# Patient Record
Sex: MALE | Race: WHITE
[De-identification: names, ages, dates, MRNs, and addresses within clinical notes are randomized per-mention and may not be internally consistent; named-entity substitution may affect disease eponyms.]

---

## 2022-09-16 ENCOUNTER — HOSPITAL ENCOUNTER (EMERGENCY)
Dept: HOSPITAL 97 - ER | Age: 77
Discharge: HOME | End: 2022-09-16
Payer: COMMERCIAL

## 2022-09-16 DIAGNOSIS — N39.0: Primary | ICD-10-CM

## 2022-09-16 DIAGNOSIS — F17.220: ICD-10-CM

## 2022-09-16 DIAGNOSIS — E11.9: ICD-10-CM

## 2022-09-16 LAB
ALBUMIN SERPL BCP-MCNC: 3.8 G/DL (ref 3.4–5)
ALP SERPL-CCNC: 95 U/L (ref 45–117)
ALT SERPL W P-5'-P-CCNC: 25 U/L (ref 12–78)
AST SERPL W P-5'-P-CCNC: 20 U/L (ref 15–37)
BUN BLD-MCNC: 12 MG/DL (ref 7–18)
GLUCOSE SERPLBLD-MCNC: 129 MG/DL (ref 74–106)
HCT VFR BLD CALC: 44.4 % (ref 39.6–49)
INR BLD: 1.04
LIPASE SERPL-CCNC: 137 U/L (ref 73–393)
LYMPHOCYTES # SPEC AUTO: 0.7 K/UL (ref 0.7–4.9)
MCV RBC: 82.8 FL (ref 80–100)
PMV BLD: 11.6 FL (ref 7.6–11.3)
POTASSIUM SERPL-SCNC: 3.9 MMOL/L (ref 3.5–5.1)
RBC # BLD: 5.37 M/UL (ref 4.33–5.43)

## 2022-09-16 PROCEDURE — 87088 URINE BACTERIA CULTURE: CPT

## 2022-09-16 PROCEDURE — 85610 PROTHROMBIN TIME: CPT

## 2022-09-16 PROCEDURE — 81015 MICROSCOPIC EXAM OF URINE: CPT

## 2022-09-16 PROCEDURE — 83690 ASSAY OF LIPASE: CPT

## 2022-09-16 PROCEDURE — 85025 COMPLETE CBC W/AUTO DIFF WBC: CPT

## 2022-09-16 PROCEDURE — 96365 THER/PROPH/DIAG IV INF INIT: CPT

## 2022-09-16 PROCEDURE — 99284 EMERGENCY DEPT VISIT MOD MDM: CPT

## 2022-09-16 PROCEDURE — 80053 COMPREHEN METABOLIC PANEL: CPT

## 2022-09-16 PROCEDURE — 36415 COLL VENOUS BLD VENIPUNCTURE: CPT

## 2022-09-16 PROCEDURE — 85730 THROMBOPLASTIN TIME PARTIAL: CPT

## 2022-09-16 PROCEDURE — 87086 URINE CULTURE/COLONY COUNT: CPT

## 2022-09-16 PROCEDURE — 81003 URINALYSIS AUTO W/O SCOPE: CPT

## 2022-09-16 PROCEDURE — 76377 3D RENDER W/INTRP POSTPROCES: CPT

## 2022-09-16 PROCEDURE — 74176 CT ABD & PELVIS W/O CONTRAST: CPT

## 2022-09-16 NOTE — EDPHYS
Physician Documentation                                                                           

 [facility]                                                                 

Name: Dennis Ramsay                                                                            

Age: 77 yrs                                                                                       

Sex: Male                                                                                         

: 1945                                                                                   

MRN: [id number]                                                                                   

Arrival Date: 2022                                                                          

Time: 13:09                                                                                       

Account#: [id number]                                                                            

Bed 13                                                                                            

Private MD: William Shoemaker                                                                       

ED Physician Joshua Robertson                                                                       

HPI:                                                                                              

                                                                                             

13:50 This 77 yrs old Male presents to ER via Ambulatory with complaints of Urinary Retention.cp  

13:50 The patient presents with urinary symptoms, unable to void, hematuria.                  cp  

13:50 Onset: The symptoms/episode began/occurred last night.                                  cp  

13:50 Associated signs and symptoms: Pertinent positives: pelvic pain, Pertinent negatives:   cp  

      constipation, diarrhea, fever, vomiting. Severity of symptoms: in the emergency             

      department the symptoms are unchanged, despite home interventions.                          

                                                                                                  

Historical:                                                                                       

- Allergies:                                                                                      

13:29 Latex, Natural Rubber;                                                                  kr3 

- Home Meds:                                                                                      

16:21 glipizide 10 mg Oral tab 2 times per day [Active]; Lyrica Oral [Active]; Nexium 20 mg   jg9 

      Oral cpDR 2 times per day [Active]; nisoldipine 17 mg Oral Tb24 BID [Active];               

- PMHx:                                                                                           

13:29 Cancer;                                                                                 kr3 

16:21 Cholelithiasis; COPD; Diabetes - IDDM;                                                  jg9 

- PSHx:                                                                                           

13:29 hip replacement x2;                                                                     kr3 

                                                                                                  

- Immunization history:: Adult Immunizations not up to date.                                      

- Social history:: Smoking status: Patient reports use of chewing tobacco.                        

                                                                                                  

                                                                                                  

ROS:                                                                                              

13:55 Constitutional: Negative for body aches, chills, fever, poor PO intake.                 cp  

13:55 Eyes: Negative for injury, pain, redness, and discharge.                                cp  

13:55 ENT: Negative for drainage from ear(s), ear pain, sore throat, difficulty swallowing,       

      difficulty handling secretions.                                                             

13:55 Cardiovascular: Negative for chest pain, edema, palpitations.                               

13:55 Respiratory: Negative for cough, shortness of breath, wheezing.                             

13:55 Abdomen/GI: Negative for vomiting, diarrhea, constipation.                                  

13:55 Back: Negative for pain at rest, pain with movement.                                        

13:55 : Positive for pelvic pain, hematuria, difficulty urinating, Negative for testicular      

      pain                                                                                        

13:55 Neuro: Negative for altered mental status, headache, syncope, weakness.                     

13:55 All other systems are negative.                                                             

                                                                                                  

Exam:                                                                                             

14:00 Constitutional: The patient appears in no acute distress, alert, awake,                 cp  

      non-diaphoretic, non-toxic, well developed, well nourished.                                 

14:00 Head/Face:  Normocephalic, atraumatic.                                                  cp  

14:00 Eyes: Periorbital structures: appear normal, Conjunctiva: normal, no exudate, no            

      injection, Sclera: no appreciated abnormality, Lids and lashes: appear normal,              

      bilaterally.                                                                                

14:00 ENT: External ear(s): are unremarkable, Nose: is normal, Mouth: Lips: moist, Oral           

      mucosa: pink and intact, moist, Posterior pharynx: Airway: no evidence of obstruction,      

      patent.                                                                                     

14:00 Chest/axilla: Inspection: normal.                                                           

14:00 Cardiovascular: Rate: normal, Edema: is not appreciated, JVD: is not appreciated.           

14:00 Respiratory: the patient does not display signs of respiratory distress,  Respirations:     

      normal, no use of accessory muscles, no retractions, labored breathing, is not present,     

      Breath sounds: are clear throughout.                                                        

14:00 Abdomen/GI: Exam negative for discomfort, distension, guarding, Inspection: abdomen         

      appears normal.                                                                             

14:00 Back: pain, is absent, ROM is normal.                                                       

14:00 Neuro: Orientation: to person, place \T\ time. Mentation: is normal, Motor: moves all       

      fours, strength is normal, Gait: is steady.                                                 

                                                                                                  

Vital Signs:                                                                                      

13:27  / 69; Pulse 78; Resp 20; Temp 97.9; Pulse Ox 98% on R/A; Weight 93.89 kg; Height kr3 

      5 ft. 8 in. (172.72 cm); Pain 10/10;                                                        

16:00  / 78; Pulse 80; Resp 17 S; Pulse Ox 97% on R/A;                                  jg9 

13:27 Body Mass Index 31.47 (93.89 kg, 172.72 cm)                                             kr3 

                                                                                                  

MDM:                                                                                              

13:37 Patient medically screened.                                                             cp  

14:00 Differential diagnosis: UTI, urinary retention, prostatitis, urethritis.                cp  

16:12 Data reviewed: vital signs, nurses notes, lab test result(s), radiologic studies, CT    cp  

      scan.                                                                                       

16:12 Counseling: I had a detailed discussion with the patient and/or guardian regarding: the cp  

      historical points, exam findings, and any diagnostic results supporting the                 

      discharge/admit diagnosis, lab results, radiology results, the need for outpatient          

      follow up, a urologist, to return to the emergency department if symptoms worsen or         

      persist or if there are any questions or concerns that arise at home. Response to           

      treatment: the patient's symptoms have markedly improved after treatment, and as a          

      result, I will discharge patient.                                                           

                                                                                                  

                                                                                             

13:45 Order name: CBC with Diff; Complete Time: 14:53                                           

                                                                                             

14:53 Interpretation: Normal except: ; MPV 11.6; ZEKE% 81.9; LYM% 8.0.                    

                                                                                             

13:45 Order name: CMP; Complete Time: 14:53                                                     

                                                                                             

13:45 Order name: Lipase; Complete Time: 14:53                                                  

                                                                                             

13:45 Order name: Urine Microscopic Only; Complete Time: 15:02                                  

                                                                                             

13:45 Order name: PT-INR; Complete Time: 14:53                                                  

                                                                                             

13:45 Order name: Ptt, Activated; Complete Time: 14:53                                          

                                                                                             

13:45 Order name: IV Saline Lock; Complete Time: 14:36                                          

                                                                                             

13:45 Order name: Labs collected and sent; Complete Time: 14:36                                 

                                                                                             

14:36 Order name: Urine Dipstick-Ancillary; Complete Time: 14:53                              [facility]

                                                                                             

14:54 Interpretation: Normal except: UKET 1+; UBLD 3+; UPROT 3+; UNIT Positive; UESTR 3+.       

                                                                                             

15:00 Order name: Urine Culture                                                               [facility]

                                                                                             

15:04 Order name: CT Stone Protocol                                                             

                                                                                             

15:08 Order name: Stone Protocol; Complete Time: 16:07                                        [facility]

                                                                                             

13:45 Order name: Urine Dipstick-Ancillary (obtain specimen); Complete Time: 14:36              

                                                                                                  

Administered Medications:                                                                         

15:48 Drug: Rocephin (cefTRIAXone) 1 grams Route: IV; Rate: calculated rate; Site: right      jg9 

      antecubital;                                                                                

16:22 Follow up: IV Status: Completed infusion; IV Intake: 10ml                               j9 

15:48 Drug: LevaQUIN (levofloxacin) 750 mg Route: PO;                                         9 

16:22 Follow up: Response: No adverse reaction                                                j9 

                                                                                                  

                                                                                                  

Disposition Summary:                                                                              

22 16:12                                                                                    

Discharge Ordered                                                                                 

      Location: Home                                                                          cp  

      Problem: new                                                                            cp  

      Symptoms: have improved                                                                 cp  

      Condition: Stable                                                                       cp  

      Diagnosis                                                                                   

        - UTI/ Urinary tract infection, site not specified                                    cp  

      Followup:                                                                               cp  

        - With: Pedro Dinh MD                                                                

        - When: next week as scheduled                                                             

        - Reason: Recheck today's complaints                                                       

      Discharge Instructions:                                                                     

        - Discharge Summary Sheet                                                             cp  

        - Urinary Tract Infection, Adult                                                      cp  

      Forms:                                                                                      

        - Medication Reconciliation Form                                                      cp  

        - Thank You Letter                                                                    cp  

        - Antibiotic Education                                                                cp  

        - Prescription Opioid Use                                                             cp  

      Prescriptions:                                                                              

        - cefpodoxime 200 mg Oral Tablet                                                           

            - take 1 tablet by ORAL route every 12 hours for 10 days with food; 20 tablet;    cp  

      Refills: 0, Product Selection Permitted                                                     

Signatures:                                                                                       

Dispatcher [employer]                           EDMS                                                 

Hadley Florez PA PA   cp                                                   

Mandy King RN                   RN   jg9                                                  

Nidia Denton RN                        RN   kr3                                                  

                                                                                                  

Corrections: (The following items were deleted from the chart)                                    

15:43 13:45 [place] ordered. cp                                                                 jg9 

                                                                                                  

**************************************************************************************************

## 2022-09-16 NOTE — XMS REPORT
Continuity of Care Document

                          Created on:2022



Patient:MADISON LANDRY

Sex:Male

:1945

External Reference #:[id number]





Demographics







                          Address                   [address] [place] [address]

 

                          Home Phone                [phone] CELL

 

                          Work Phone                [phone]

 

                          Mobile Phone              [phone]

 

                          Email Address             [email or web address]

 

                          Preferred Language        English

 

                          Marital Status            Unknown

 

                          Sikh Affiliation     Unknown

 

                          Race                      Unknown

 

                          Ethnic Group              Unknown









Author







                          Organization              [facility]

t

 

                          Address                   [address]



                                                    [place] [address]

 

                          Phone                     [phone]









Care Team Providers







                    Name                Role                Phone

 

                    Miller_S_AH         Attending Clinician Unavailable

 

                    Gretchen-Mbayo_A_AH    Attending Clinician Unavailable

 

                    Miller_S_AH         Admitting Clinician Unavailable

 

                    Gretchen-Mbayo_A_AH    Admitting Clinician Unavailable









Payers







           Payer Name Policy Type Policy Number Effective Date Expiration Date S

ildefonsoce

 

           [employer] [place] -            [id number]  2020            



           [employer]                        00:00:00              



           (MEDICARE                                              



           REPLACEMENT/ADVANT                                             



           AGE - HMO)                                             







Problems







       Condition Condition Condition Status Onset  Resolution Last   Treating Co

mments 

Source



       Name   Details Category        Date   Date   Treatment Clinician        



                                                 Date                 

 

       Rheumatoid Rheumatoid Problem Active                              V

illage



       arthritis Arthritis                                              Fami

ly



                                   00:00:                             Practic



                                   00                                 e







Allergies, Adverse Reactions, Alerts

This patient has no known allergies or adverse reactions.



Social History







                Smoking Status  Start Date      Stop Date       Source

 

                Former Smoker                                   [facility]

ractice







Medications

This patient has no known medications.



Vital Signs







             Vital Name   Observation Time Observation Value Comments     Source

 

             BP Systolic  2021 00:00:00 115 mm[Hg]                [facility]

 

             Body Weight  2021 00:00:00 210 [lb_av]               [facility]

 

             BP Diastolic 2021 00:00:00 80 mm[Hg]                 [facility]

 

             Height       2021 00:00:00 68 [in_i]                 [facility]

 

             BMI (Body Mass 2021 00:00:00 31.9 kg/m2                [facility])                                              Practice







Procedures

This patient has no known procedures.



Plan of Care







             Planned Activity Planned Date Details      Comments     Source

 

             Future Scheduled Test              Patient denies any              

[facility]



                                       pain or discomfirt.              Practice



                                       Patient denies taking              



                                       any meds at this time.              



                                       Patient report he is              



                                       able to see his doc              



                                       when needed. Patient              



                                       encouarged to choose              



                                       healther food choices              



                                       and increase activites              



                                       as tolerated. [code =              



                                       Patient denies any              



                                       pain or discomfirt.              



                                       Patient denies taking              



                                       any meds at this time.              



                                       Patient report h]              

 

             Instructions                                        [facility]







Encounters







        Start   End     Encounter Admission Attending Care    Care    Encounter 

Source



        Date/Time Date/Time Type    Type    Clinicians Facility Department ID   

   

 

        2021 Outpatient         Miller_S_AH VFP     VFP     [address]

 [facility]



        06:24:00 06:24:00                                         [id number]   Family



                                                                        Practic



                                                                        e

 

        2021 Outpatient         Gretchen-Mbayo VFP     VFP     794

 [facility]



        02:43:00 02:43:00                 _A_AH                   [id number]   Family



                                                                        Practic



                                                                        e

 

        2021 Outpatient         Gretchen-Mbayo VFP     VFP     794

 [facility]



        09:05:00 09:05:00                 _A_AH                   [id number]   Family



                                                                        Practic



                                                                        e

 

        2021 Outpatient         Gretchen-Mbayo VFP     VFP     [address]

 [facility]



        11:13:00 11:13:00                 _A_AH                   [id number]   Family



                                                                        Practic



                                                                        e

 

        2021 Outpatient         Gretchen-Mbayo VFP     VFP     [address]

 [facility]



        09:44:00 09:44:00                 _A_AH                   [id number]   Family



                                                                        Practic



                                                                        e

 

        2021 [place]                 VFP     [place] -    [id number] V

illage



        00:00:00 00:00:00 [facility]         Phil rinaldi NP:                          Medical -         Practi

c



                        [email or web address]         e



                        [facility], [address]                         [place]           



                        [address],                            [address]          



                        [place], [place]                                              



                        [address]                                         



                        , Ph.                                           



                        [phone]                                         

 

        2020-07-15 2020-07-15 Outpatient         Gretchen-Mbayo VFP     VFP     [address]

 [facility]



        12:32:00 12:32:00                 _A_AH                   [id number]   Family



                                                                        Practic



                                                                        e

 

        2020-07-15 2020-07-15 Outpatient         Gretchen-Mbayo VFP     VFP     [address]

 [facility]



        12:32:00 12:32:00                 _A_AH                   [id number]   Family



                                                                        Practic



                                                                        e

 

        2020 Outpatient         GretchenMarilia VFP     VFP     [address]

- [facility]



        07:18:00 07:18:00                 _A_AH                   [id number]   Family



                                                                        Practic



                                                                        e

 

        2020 Outpatient         Gretchen-Fátimao VFP     VFP     [address]

 [facility]



        07:18:00 07:18:00                 _A_AH                   [id number]   Family



                                                                        Practic



                                                                        e







Results

This patient has no known results.

## 2022-09-16 NOTE — RAD REPORT
EXAM DESCRIPTION:  CTStone Protocol - 9/16/2022 3:20 pm

 

CLINICAL HISTORY:

hematuria

 

COMPARISON:  Stone Protocol dated 8/29/2016; CT-STONE PROTOCOL dated 8/18/2009

 

TECHNIQUE:  CT of the abdomen and pelvis was performed.

 

All CT scans are performed using dose optimization technique as appropriate and may include automated
 exposure control or mA/KV adjustment according to patient size.

 

FINDINGS:  Lower chest: Scattered coronary artery calcifications.

Liver: No acute abnormality or suspicious lesions.

Biliary: Cholecystectomy.

Stomach: No significant focal abnormality.

Duodenum: No significant focal abnormality.

Pancreas: No significant abnormality.

Spleen: No significant abnormality.

Adrenal: No suspicious lesions.

Kidney/ureter: No hydronephrosis. No renal calculi.

Retroperitoneum: No retroperitoneal adenopathy.

Vascular: No aneurysm.

Bowel: No significant focal abnormality. Normal appendix.

Peritoneum: No ascites or free air.

Bladder: The bladder is largely obscured by streak artifact. The wall does appear thick and stranding
 may be present.

Reproductive: No adnexal masses.

Bones: No acute fracture. Bilateral shoulder arthroplasties. Bridging osteophytes in the lower thorac
ic spine.

Other: n/a

 

IMPRESSION:  No acute intra-abdominal or pelvic finding. No urinary tract calculi identified. Note th
at the bladder wall is probably thick but the bladder is largely obscured by streak artifact from the
 hip arthroplasties. Cystoscopy could better evaluate if clinically indicated.

## 2022-09-16 NOTE — ER
Nurse's Notes                                                                                     

 [facility]                                                                 

Name: Dennis Ramsay                                                                            

Age: 77 yrs                                                                                       

Sex: Male                                                                                         

: 1945                                                                                   

MRN: [id number]                                                                                   

Arrival Date: 2022                                                                          

Time: 13:09                                                                                       

Account#: [id number]                                                                            

Bed 13                                                                                            

Private MD: William Shoemaker                                                                       

Diagnosis: UTI/ Urinary tract infection, site not specified                                       

                                                                                                  

Presentation:                                                                                     

                                                                                             

13:27 Chief complaint: Patient states: severe pain in pelvic area, bloody urine last night    kr3 

      when urinating 09/15/2022. Coronavirus screen: Vaccine status: Patient reports being        

      unvaccinated. Client denies travel out of the [place] in the last 14 days. Ebola Screen:       

      Patient denies travel to an Ebola-affected area in the 21 days before illness onset.        

      Initial Sepsis Screen: Does the patient meet any 2 criteria? No. Patient's initial          

      sepsis screen is negative. Does the patient have a suspected source of infection? Yes:      

      Dysuria/Frequency/Urgency/UTI. Risk Assessment: Do you want to hurt yourself or someone     

      else? Patient reports no desire to harm self or others. Onset of symptoms was September     

      15, 2022.                                                                                   

13:27 Method Of Arrival: Ambulatory                                                           kr3 

13:27 Acuity: SHANTHI 3                                                                           kr3 

                                                                                                  

Triage Assessment:                                                                                

13:31 General: Appears distressed, uncomfortable, Behavior is calm, cooperative, appropriate  kr3 

      for age. General: Behavior is. Pain: Complains of pain in suprapubic area Pain              

      currently is 10 out of 10 on a pain scale.                                                  

                                                                                                  

Historical:                                                                                       

- Allergies:                                                                                      

13:29 Latex, Natural Rubber;                                                                  kr3 

- Home Meds:                                                                                      

16:21 glipizide 10 mg Oral tab 2 times per day [Active]; Lyrica Oral [Active]; Nexium 20 mg   jg9 

      Oral cpDR 2 times per day [Active]; nisoldipine 17 mg Oral Tb24 BID [Active];               

- PMHx:                                                                                           

13:29 Cancer;                                                                                 kr3 

16:21 Cholelithiasis; COPD; Diabetes - IDDM;                                                  jg9 

- PSHx:                                                                                           

13:29 hip replacement x2;                                                                     kr3 

                                                                                                  

- Immunization history:: Adult Immunizations not up to date.                                      

- Social history:: Smoking status: Patient reports use of chewing tobacco.                        

                                                                                                  

                                                                                                  

Screenin:08 Abuse screen: Denies threats or abuse. Denies injuries from another. Nutritional        jg9 

      screening: No deficits noted. Tuberculosis screening: No symptoms or risk factors           

      identified. Fall Risk.                                                                      

                                                                                                  

Assessment:                                                                                       

16:00 Reassessment: patient reports he feels better and he has urinated a couple times since  jg9 

      the [place] was removed. Patient states feeling better. Patient states symptoms have          

      improved.                                                                                   

                                                                                                  

Vital Signs:                                                                                      

13:27  / 69; Pulse 78; Resp 20; Temp 97.9; Pulse Ox 98% on R/A; Weight 93.89 kg; Height kr3 

      5 ft. 8 in. (172.72 cm); Pain 10/10;                                                        

16:00  / 78; Pulse 80; Resp 17 S; Pulse Ox 97% on R/A;                                  jg9 

13:27 Body Mass Index 31.47 (93.89 kg, 172.72 cm)                                             kr3 

                                                                                                  

ED Course:                                                                                        

13:09 Patient arrived in ED.                                                                  mr  

13:09 William Shoemaker MD is Private Physician.                                               mr  

13:29 Triage completed.                                                                       kr3 

13:32 Arm band placed on right wrist.                                                         kr3 

13:37 Hadley Florez PA is PHCP.                                                                cp  

13:37 Joshua Robertson MD is Attending Physician.                                              cp  

13:51 Colby Brooks, RN is Primary Nurse.                                                     ll1 

14:36 Urine collected: clean catch specimen, mariel colored, blood tinged, timoteo blood.        jw7 

14:36 CMP Sent.                                                                               jw7 

14:36 Lipase Sent.                                                                            jw7 

14:36 Urine Microscopic Only Sent.                                                            jw7 

14:37 Initial lab(s) drawn, by me, sent to lab. Inserted saline lock: 20 gauge in right       jw7 

      antecubital area, using aseptic technique. Blood collected.                                 

14:37 PT-INR Sent.                                                                            jw7 

14:37 Ptt, Activated Sent.                                                                    jw7 

15:22 Stone Protocol In Process Unspecified.                                                  EDMS

16:00 Patient has correct armband on for positive identification. Bed in low position. Call   jg9 

      light in reach. Side rails up X 1.                                                          

16:07 CT [facility] Protocol Sent.                                                                 jg9 

16:08 IV discontinued.                                                                        jg9 

16:11 Pedro Dinh MD is Referral Physician.                                              cp  

16:20 No provider procedures requiring assistance completed.                                  jg9 

                                                                                                  

Administered Medications:                                                                         

15:48 Drug: Rocephin (cefTRIAXone) 1 grams Route: IV; Rate: calculated rate; Site: right      jg9 

      antecubital;                                                                                

16:22 Follow up: IV Status: Completed infusion; IV Intake: 10ml                               jg9 

15:48 Drug: LevaQUIN (levofloxacin) 750 mg Route: PO;                                         jg9 

16:22 Follow up: Response: No adverse reaction                                                jg9 

                                                                                                  

                                                                                                  

Medication:                                                                                       

16:21 VIS not applicable for this client.                                                     jg9 

                                                                                                  

Intake:                                                                                           

16:22 IV: 10ml; Total: 10ml.                                                                  jg9 

                                                                                                  

Outcome:                                                                                          

16:12 Discharge ordered by MD.                                                                ronn  

16:21 Discharged to home ambulatory.                                                          jg9 

16:21 Condition: improved                                                                         

16:21 Discharge instructions given to patient, Instructed on discharge instructions, follow       

      up and referral plans. Demonstrated understanding of instructions, follow-up care,          

      Prescriptions given X 1.                                                                    

16:21 Patient left the ED.                                                                    jg9 

                                                                                                  

Signatures:                                                                                       

Dispatcher [employer]                           [facility]                                                 

RonaldoMirian Corey, PA PA cp Lewis, Lynsay, RN                       RN   ll1                                                  

Mandy King RN                   RN   jg9                                                  

Fanny Lyman7                                                  

Nidia Denton RN                        RN   kr3                                                  

                                                                                                  

**************************************************************************************************

## 2022-09-17 VITALS — SYSTOLIC BLOOD PRESSURE: 130 MMHG | OXYGEN SATURATION: 97 % | DIASTOLIC BLOOD PRESSURE: 78 MMHG

## 2022-09-17 VITALS — TEMPERATURE: 97.9 F

## 2024-08-05 ENCOUNTER — HOSPITAL ENCOUNTER (EMERGENCY)
Dept: HOSPITAL 97 - ER | Age: 79
Discharge: HOME | End: 2024-08-05
Payer: COMMERCIAL

## 2024-08-05 VITALS — DIASTOLIC BLOOD PRESSURE: 70 MMHG | OXYGEN SATURATION: 97 % | SYSTOLIC BLOOD PRESSURE: 155 MMHG | TEMPERATURE: 97.6 F

## 2024-08-05 DIAGNOSIS — M54.50: Primary | ICD-10-CM

## 2024-08-05 PROCEDURE — 72170 X-RAY EXAM OF PELVIS: CPT

## 2024-08-05 PROCEDURE — 72220 X-RAY EXAM SACRUM TAILBONE: CPT

## 2024-08-05 PROCEDURE — 99284 EMERGENCY DEPT VISIT MOD MDM: CPT

## 2024-08-05 PROCEDURE — 96372 THER/PROPH/DIAG INJ SC/IM: CPT

## 2024-08-05 NOTE — ER
Nurse's Notes                                                                                     

 [facility]                                                                 

Name: Dennis Ramsay                                                                            

Age: 79 yrs                                                                                       

Sex: Male                                                                                         

: 1945                                                                                   

MRN: [id number]                                                                                   

Arrival Date: 2024                                                                          

Time: 10:15                                                                                       

Account#: [id number]                                                                            

Bed 10                                                                                            

Private MD:                                                                                       

Diagnosis: Low back pain                                                                          

                                                                                                  

Presentation:                                                                                     

                                                                                             

10:38 Chief complaint: Patient states: Fell at least two weeks ago. L knee pain and low back  hb  

      pain since. Coronavirus screen: Client denies travel out of the [place] in the last 14         

      days. At this time, the client does not indicate any symptoms associated with               

      coronavirus-19. Ebola Screen: Patient denies travel to an Ebola-affected area in the 21     

      days before illness onset. Initial Sepsis Screen: Does the patient meet any 2 criteria?     

      No. Patient's initial sepsis screen is negative. Does the patient have a suspected          

      source of infection? No. Patient's initial sepsis screen is negative. Risk Assessment:      

      Do you want to hurt yourself or someone else? Patient reports no desire to harm self or     

      others. Onset of symptoms was 2024.                                                

10:38 Method Of Arrival: Wheelchair                                                           hb  

10:38 Acuity: SHANTHI 4                                                                           hb  

13:18 Care prior to arrival: None. Mechanism of Injury: Fall. Trauma event details: Injury    ll1 

      occurred in the [facility].                                                         

                                                                                                  

Triage Assessment:                                                                                

13:18 General: Appears in no apparent distress. Behavior is calm, cooperative, appropriate    ll1 

      for age.                                                                                    

                                                                                                  

Trauma Activation: Not Applicable                                                                 

 Physician: ED Physician; Name: ; Notified At: ; Arrived At:                                      

 Physician: General Surgeon; Name: ; Notified At: ; Arrived At:                                   

 Physician: Radiology; Name: ; Notified At: ; Arrived At:                                         

 Physician: Respiratory; Name: ; Notified At: ; Arrived At:                                       

 Physician: Lab; Name: ; Notified At: ; Arrived At:                                               

                                                                                                  

Historical:                                                                                       

- Allergies:                                                                                      

10:40 Latex;                                                                                  hb  

- PMHx:                                                                                           

10:40 Cancer; Cholelithiasis; COPD; Diabetes - IDDM;                                          hb  

- PSHx:                                                                                           

10:40 hip replacement x2;                                                                     hb  

                                                                                                  

- Immunization history:: Adult Immunizations up to date.                                          

- Infectious Disease History:: Denies.                                                            

- Immunization history: Last tetanus immunization: - up to date.                                  

- Social history:: Smoking status: Patient denies any tobacco usage or history of.                

                                                                                                  

                                                                                                  

Screenin:15 [facility] ED Fall Risk Assessment (Adult) History of falling in the last 3 months,       ll1 

      including since admission Yes- single mechanical fall (1 pt) Confusion or                   

      Disorientation No (0 pts) Intoxicated or Sedated No (0 pts) Impaired Gait Yes (1 pt)        

      Mobility Assist Device Used Yes (1 pt) Altered Elimination No (0 pt) Score/Fall Risk        

      Level 3 or more points = High Risk Maintained a safe environment, Hourly rounding           

      (assess needs \T\ fall precautionary measures) done. Abuse screen: Denies threats or        

      abuse. Nutritional screening: No deficits noted. Tuberculosis screening: No symptoms or     

      risk factors identified.                                                                    

                                                                                                  

Primary Survey:                                                                                   

12:29 NO uncontrolled hemorrhage observed. A: The client is awake and alert. The airway is    ll1 

      patent. Breathing/Chest: Spontaneous respiratory effort, equal unlabored respirations,      

      breath sounds clear bilaterally, regular pattern, symmetrical chest rise and fall.          

      Circulation: No external hemorrhage present. Regular and strong central pulse, skin         

      warm/dry/normal color. Disability Client is alert. Exposure/Environment: There is no        

      evidence of uncontrolled external bleeding.                                                 

13:17 Reassessment Alertness and Airway: Awake and alert. The airway is patent. Breathing:    ll1 

      Spontaneous respiratory effort, equal unlabored respirations, breath sounds clear           

      bilaterally, regular pattern with symmetrical chest rise and fall. Circulation: No          

      external hemorrhage noted. Regular and strong central pulse, skin warm/dry/normal           

      color. Disability: Alert.                                                                   

                                                                                                  

Assessment:                                                                                       

10:38 General: Appears uncomfortable, Behavior is calm, cooperative, appropriate for age.     ll1 

      Pain: Complains of pain in back Pain radiates to left leg. Musculoskeletal:                 

      Circulation, motion, and sensation intact. Capillary refill < 3 seconds, Reports pain       

      in back and left leg.                                                                       

                                                                                                  

Vital Signs:                                                                                      

10:38  / 70; Pulse 52; Resp 17; Temp 97.6; Pulse Ox 97% on R/A; Weight 90.72 kg; Height hb  

      5 ft. 7 in. ;                                                                               

10:38 Body Mass Index 31.32 (90.72 kg, 170.18 cm)                                             hb  

                                                                                                  

Raciel Coma Score:                                                                               

13:17 Eye Response: spontaneous(4). Motor Response: obeys commands(6). Verbal Response:       ll1 

      oriented(5). Total: 15.                                                                     

                                                                                                  

Trauma Score (Adult):                                                                             

13:17 Eye Response: spontaneous(1); Verbal Response: oriented(1); Motor Response: obeys       ll1 

      commands(2); Systolic BP: > 89 mm Hg(4); Respiratory Rate: 10 to 29 per min(4); [place]     

      Score: 15; Trauma Score: 12                                                                 

                                                                                                  

ED Course:                                                                                        

10:18 Patient arrived in ED.                                                                  [id number] 

10:20 Rome Lopez MD is Attending Physician.                                               ec2 

10:38 Arm band placed on.                                                                     hb  

10:40 Triage completed.                                                                       hb  

11:45 Pelvis XRAY In Process Unspecified.                                                     EDMS

11:45 Sacrum And Coccyx XRAY In Process Unspecified.                                          EDMS

13:18 Patient has correct armband on for positive identification. Provided Education on:      ll1 

      return for worsening symptoms.                                                              

13:18 No provider procedures requiring assistance completed. Patient did not have IV access   ll1 

      during this emergency room visit.                                                           

13:19 Patient maintains SpO2 saturation greater than 95% on room air.                         ll1 

13:19 Thermoregulation: warm blanket given to patient.                                        ll1 

                                                                                                  

Administered Medications:                                                                         

11:55 Drug: Ketorolac IM 30 mg IM once Route: IM; Site: left vastus lateralis;                ll1 

13:20 Follow up: Response: No adverse reaction                                                ll1 

11:55 Drug: Lidoderm Topical Patch 5 % (700 mg/patch) 1 patches Topical once; leave on for 12 ll1 

      hours; cover most painful area; may cut into smaller pieces {Note: low back.} Route:        

      Topical; Site: affected area;                                                               

13:20 Follow up: Response: No adverse reaction                                                ll1 

11:55 Drug: Diazepam PO 10 mg PO once {Note: pain 8/10.} Route: PO;                           ll1 

13:20 Follow up: Response: No adverse reaction; Pain is decreased; RASS: Alert and Calm (0)   ll1 

11:55 Drug: Acetaminophen PO 1000 mg PO once Route: PO;                                       ll1 

13:20 Follow up: Response: No adverse reaction                                                ll1 

                                                                                                  

                                                                                                  

Medication:                                                                                       

13:19 VIS not applicable for this client.                                                     ll1 

                                                                                                  

Intake:                                                                                           

13:19 PO: 100ml (Water); Total: 100ml.                                                        ll1 

                                                                                                  

Output:                                                                                           

13:19 Urine: 0ml; Total: 0ml.                                                                 ll1 

                                                                                                  

Outcome:                                                                                          

12:24 Discharge ordered by MD.                                                                ec2 

12:29 Patient left the ED.                                                                    ll1 

13:18 Discharged to home ambulatory,                                                          ll1 

13:18 Condition: stable                                                                           

13:18 Discharge instructions given to patient, Instructed on discharge instructions, follow       

      up and referral plans. no drinking with medication, no driving heavy equipment,             

      medication usage, Demonstrated understanding of instructions, follow-up care,               

      medications, Prescriptions given X 1,                                                       

13:19 Patient's length of stay was not longer than 2 hours.                                   ll1 

                                                                                                  

Signatures:                                                                                       

Dispatcher [employer]                           EDCarey Wade RN RN hb Lewis, Lynsay, RN RN   ll1                                                  

Rome Lopez MD MD   ec2                                                  

Yenifer Matt                                   3                                                  

                                                                                                  

**************************************************************************************************

## 2024-08-05 NOTE — RAD REPORT
EXAM DESCRIPTION:  RAD - Pelvis - 8/5/2024 11:44 am

 

CLINICAL HISTORY:  PAIN

 

COMPARISON:  PELVIS dated 7/29/2014

 

TECHNIQUE:   Single AP view of the pelvis.

 

FINDINGS:  The visualized pelvic ring is intact. No suspicious osseous lesions. Bilateral total hip a
rthroplasty hardware. Included components are in unchanged positioning. Heterotopic ossification wilma
cent to the left greater trochanter. Other pelvic joints are unremarkable. Visualized aspects of the 
abdomen and soft tissues are unremarkable.

 

IMPRESSION:  No acute osseous abnormality of the bony pelvis.

## 2024-08-05 NOTE — XMS REPORT
Continuity of Care Document



                           Created on: 2024





DENNIS LANDRY

External Reference #: [id number]

: 1945

Sex: Male



Demographics





                                        Address             [address] [place]  [address]

 

                                        Home Phone          [phone]

 

                                        Work Phone          [phone]

 

                                        Mobile Phone        [phone]

 

                                        Email Address       [email or web address]

 

                                        Preferred Language  English

 

                                        Marital Status      Unknown

 

                                        Worship Affiliation Unknown

 

                                        Race                Unknown

 

                                        Additional Race(s)  White

 

                                        Ethnic Group        Unknown





Author





                                        Name                Unknown

 

                                        Address             [address]

[place] [id number]

 

                                        \[facility]\""

[facility]

 

                                        Address             [address]

[address]

[place]  [address]

 

                                        Phone               [phone]





Support





                          Name         Relationship Address      Phone

 

                                Dulce Dennis Personal Relationship [address] [place]  [address]                  [phone]





Care Team Providers





                                Care Team Member Name Role            Phone

 

                                William Shoemaker Attending Clinician Unavailable

 

                                Miller_S_AH     Attending Clinician Unavailable

 

                                Gretchen-Mbayo_A_AH Attending Clinician Unavailable

 

                                Miller_S_AH     Admitting Clinician Unavailable

 

                                Gretchen-Mbayo_A_AH Admitting Clinician Unavailable







Payers





                    Payer Name Policy Type Policy Number Effective Date Expirati

on Date Source

 

                                                    [employer] Preferred 

Medicare (HMO) 53           [id number]                               [facility] 

(MEDICARE 

REPLACEMENT/ADV

ANTAGE - HMO)                           [id number]           2020 

00:00:00                                            







Problems





                                                    Condition 

Name                                    Condition 

Details                                 Condition 

Category                  Status                    Onset 

Date                                    Resolution 

Date                                    Last 

Treatment 

Date                                    Treating 

Clinician                 Comments                  Source

 

                                                    Rheumatoid 

arthritis                               Rheumatoid 

Arthritis           Problem             Active               

00:00:

00                                                               [facility] 

Family 

[facility]ic

e

 

                                        [id number]           Gross 

hematuria Problem                                                 [facility]

 

                                        [id number]           Prostate 

cancer  Problem                                                 [facility]

 

                                        [id number]           History of 

prostate 

cancer  Problem                                                 [facility]

 

                                        [id number]           Acquired 

phimosis 

of penis Problem                                                 [facility]

 

                                        [id number]           S/P 

radiation 

therapy Problem                                                 [facility]

 

                                        [id number]           Lower 

urinary 

tract 

symptoms 

(LUTS)  Problem                                                 [facility]

 

                                                    [id number]

[id number]                                 Other 

stricture 

of 

overlappin

g sites of 

urethra in 

male    Problem                                                 [facility]

 

                                                    Personal 

history of 

primary 

malignant 

neoplasm 

of urinary 

bladder                                 History of 

primary 

bladder 

cancer  Problem                                                 [facility]

 

                                        [id number]            Type 2 

diabetes 

mellitus 

with 

diabetic 

neuropathy

, without 

long-term 

current 

use of 

insulin Problem                                                 [facility]

 

                                        [id number]            Chronic 

obstructiv

e 

pulmonary 

disease, 

unspecifie

d COPD 

type    Problem                                                 [facility]







Social History





                    Social Habit Start Date Stop Date Quantity  Comments  Source

 

                    History of Tobacco Use                                      

   [facility]

 

                    Sex Assigned At Birth                                       

  [facility]







                          Smoking Status Start Date   Stop Date    Source

 

                          Former Smoker 2022 00:00:00 2022 00:00:00 

[facility]







Medications





                                                    Ordered 

Medication 

Name                                    Filled 

Medication 

Name                                    Start 

Date                                    Stop 

Date                                    Current 

Medication?                             Ordering 

Clinician       Indication      Dosage          Frequency       Signature 

(SIG)               Comments            Components          Source

 

                                                    Clotrimazol

e-Betametha

sone 1-0.05 

%                                       Clotrimazol

e-Betametha

sone 1-0.05 

%                                       2022 

00:00:

00                                       

00:00

:00        No                                          BID        Clotrimazo

le-Betamet

hasone 

1-0.05 %                                                    

 

          Flomax Flomax           No                       Flomax           

 

                                                    Cipro 500 

MG                                      Cipro 500 

MG                               No                               1{table

t}                        BID                       Cipro 500 

MG                                                          

 

                                                    No Known 

Medications                             No Known 

Medications             No                                              [facility]







Vital Signs





                      Vital Name Observation Time Observation Value Comments   S

ource

 

                      height     2022 14:30:00 68 [in_i]             Commo

n [facility]

 

                      weight     2022 14:30:00 215 [lb_av]            Comm

on [facility]

 

                      temperature 2022 14:30:00 97.5 [degF]            Com

mon [facility]

 

                      bmi        2022 14:30:00 32.69 kg/m2            Comm

on [facility]

 

                      oximetry   2022 14:30:00 98 %                  Commo

n [facility]

 

                      respiratory rate 2022 14:30:00 18 /min              

 [facility]

 

                                                    blood pressure 

systolic        2022 14:30:00 146 mm[Hg]                      [facility]

 

                                                    blood pressure 

diastolic       2022 14:30:00 68 mm[Hg]                       [facility]

[facility]

 

                      height     2022 11:30:00 68 [in_i]             Commo

n [facility]

 

                      weight     2022 11:30:00 211 [lb_av]            Comm

on [facility]

 

                      temperature 2022 11:30:00 97.3 [degF]            Com

mon [facility]

 

                      bmi        2022 11:30:00 32.08 kg/m2            Comm

on [facility]

 

                      oximetry   2022 11:30:00 97 %                  Commo

n [facility]

 

                      respiratory rate 2022 11:30:00 16 /min              

 [facility]

 

                                                    blood pressure 

systolic        2022 11:30:00 163 mm[Hg]                      [facility]

 

                                                    blood pressure 

diastolic       2022 11:30:00 73 mm[Hg]                       [facility]

 

                      BP Systolic 2021 00:00:00 115 mm[Hg]            [facility]

 

                      Body Weight 2021 00:00:00 210 [lb_av]            [facility]

 

                      BP Diastolic 2021 00:00:00 80 mm[Hg]             [facility]

 

                      Height     2021 00:00:00 68 [in_i]             [facility]

 

                      BMI (Body Mass Index) 2021 00:00:00 31.9 kg/m2      

      [facility]







Plan of Care





                      Planned Activity Planned Date Details    Comments   Source

 

                                Future Scheduled Test                 Patient de

nies any 

pain or discomfirt. 

Patient denies taking 

any meds at this time. 

Patient report he is 

able to see his doc 

when needed. Patient 

encouarged to choose 

healther food choices 

and increase activites 

as tolerated. [code = 

Patient denies any 

pain or discomfirt. 

Patient denies taking 

any meds at this time. 

Patient report h]                                   [facility]

 

                      Instructions                                  [facility]







Encounters





                                                    Start 

Date/Time                               End 

Date/Time                               Encounter 

Type                                    Admission 

Type                                    Attending 

Clinicians                              Care 

Facility                                Care 

Department                              Encounter 

ID                                      Source

 

                                                    2022 

11:23:07                        Outpatient                      [facility] Shoemaker              [facility]              [phone]

[id number]                                   [facility]

 

                                                    2022 

00:00:00                                2022 

00:00:00  (TEL)                         [facility]    [facility]    [id number]   [facility]

 

                                                    2022 

00:00:00                                2022 

00:00:00                                OFFICE 

VISIT 

ESTAB PT 

LEVEL 2                          [facility]     [facility]     [id number]    [facility]

 

                                                    2022 

00:00:00                                2022 

00:00:00                                OFFICE 

VISIT NEW 

PT LEVEL 4                       [facility]     [facility]     [id number]    [facility]

 

                                                    2021 

06:24:00                                2021 

06:24:00   Outpatient            Miller_S_AH [facility]        [facility]        [phone]

[id number]                                   [facility]

e

 

                                                    2021 

02:43:00                                2021 

02:43:00            Outpatient                              Gretchen-Mbayo

_A_AH               [facility]                 [facility]                 [phone]

[id number]                                   [facility]

e

 

                                                    2021 

09:05:00                                2021 

09:05:00            Outpatient                              Gretchen-Mbayo

_A_AH               [facility]                 [facility]                 [phone]

[id number]                                   [facility]

e

 

                                                    2021 

11:13:00                                2021 

11:13:00            Outpatient                              Gretchen-Mbayo

_A_AH               [facility]                 [facility]                 [phone]

[id number]                                   [facility]

e

 

                                                    2021 

09:44:00                                2021 

09:44:00            Outpatient                              Gretchen-Mbayo

_A_AH               [facility]                 [facility]                 [phone]

[id number]                                   [facility]

e

 

                                                    2021 

00:00:00                                2021 

00:00:00                                Elma rinaldi, NP: 

[address], [address], 

[place] 

[address]

, Ph. 

[phone]                                        [facility]FP             [place] 

[facility] 

Medical - 

[email or web address]_

[place] 

Direct                    [id number]                  [facility]

e

 

                                                    2020-07-15 

12:32:00                                2020-07-15 

12:32:00            Outpatient                              Gretchen-Mbayo

_A_AH               [facility]                 [facility]                 [phone]

[id number]                                   [facility]

e

 

                                                    2020-07-15 

12:32:00                                2020-07-15 

12:32:00            Outpatient                              Gretchen-Mbayo

_A_AH               [facility]                 [facility]                 [phone]

[id number]                                   [facility]

e

 

                                                    2020 

07:18:00                                2020 

07:18:00            Outpatient                              Gretchen-Mbayo

_A_AH               [facility]                 [facility]                 [phone]

[id number]                                   [facility]

e

 

                                                    2020 

07:18:00                                2020 

07:18:00            Outpatient                              Gretchen-Mbayo

_A_AH               [facility]                 [facility]                 [phone]

[id number]                                   [facility]

e

## 2024-08-05 NOTE — RAD REPORT
EXAM DESCRIPTION:  Sacrum And Coccyx - 8/5/2024 11:44 am

 

CLINICAL HISTORY:  PAIN

 

COMPARISON:  SPINE LUMBAR W OBLIQUE dated 1/23/2008; Stone Protocol dated 9/16/2022

 

TECHNIQUE:  Three views of the sacrum and coccyx.

 

FINDINGS:  No evidence of acute displaced fractures or a suspicious osseous lesion. Degenerative castillo
ges of the lower lumbar spine are noted. No other acute or suspicious findings.

 

IMPRESSION:  No acute findings. Lower lumbar spine degenerative changes.

## 2024-08-05 NOTE — EDPHYS
Physician Documentation                                                                           

 [facility]                                                                 

Name: Dennis Ramsay                                                                            

Age: 79 yrs                                                                                       

Sex: Male                                                                                         

: 1945                                                                                   

MRN: [id number]                                                                                   

Arrival Date: 2024                                                                          

Time: 10:15                                                                                       

Account#: [id number]                                                                            

Bed 10                                                                                            

Private MD:                                                                                       

ED Physician Rome Lopez                                                                        

HPI:                                                                                              

                                                                                             

10:42 This 79 yrs old  Male presents to ER via Wheelchair with complaints of Fall    ec2 

      Injury, Back Pain.                                                                          

10:42 Patient arrives today for low back pain. Patient reports he is having pain near the     ec2 

      buttock region. Patient reports pain is left-sided, reports no falls injuries or trauma     

      recently, states he did have a fall several weeks ago. States he is having worsening        

      pain however has not tried any medications at home. Patient reports no red flag             

      symptoms. Also is concerned about his previous hip replacements..                           

                                                                                                  

Historical:                                                                                       

- Allergies:                                                                                      

10:40 Latex;                                                                                  hb  

- PMHx:                                                                                           

10:40 Cancer; Cholelithiasis; COPD; Diabetes - IDDM;                                          hb  

- PSHx:                                                                                           

10:40 hip replacement x2;                                                                     hb  

                                                                                                  

- Immunization history:: Adult Immunizations up to date.                                          

- Infectious Disease History:: Denies.                                                            

- Immunization history: Last tetanus immunization: - up to date.                                  

- Social history:: Smoking status: Patient denies any tobacco usage or history of.                

                                                                                                  

                                                                                                  

ROS:                                                                                              

10:42 Constitutional: as per hpi                                                              ec2 

                                                                                                  

Exam:                                                                                             

10:42 Constitutional:  GEN: NAD                                                                   

Head: atraumatic                                                                                  

Eyes: EOMI                                                                                        

Ears: External ears are          ec2                                                              

      normal.                                                                                     

CV: regular rate                                                                                  

LUNGS: no respiratory distress                                                                    

ABD: non-distended                                                                                

SKIN: no                                                                                          

      evidence of rashes                                                                          

MSK: no evidence of trauma, reproducible sacrum TTP, no deformities                               

      or crepitus, left paraspinal TTP as well.                                                   

NEURO: moves all extremities equally                                                              

                                                                                                  

Vital Signs:                                                                                      

10:38  / 70; Pulse 52; Resp 17; Temp 97.6; Pulse Ox 97% on R/A; Weight 90.72 kg; Height hb  

      5 ft. 7 in. ;                                                                               

10:38 Body Mass Index 31.32 (90.72 kg, 170.18 cm)                                             hb  

                                                                                                  

Raciel Coma Score:                                                                               

13:17 Eye Response: spontaneous(4). Motor Response: obeys commands(6). Verbal Response:       ll1 

      oriented(5). Total: 15.                                                                     

                                                                                                  

Trauma Score (Adult):                                                                             

13:17 Eye Response: spontaneous(1); Verbal Response: oriented(1); Motor Response: obeys       ll1 

      commands(2); Systolic BP: > 89 mm Hg(4); Respiratory Rate: 10 to 29 per min(4); Raciel     

      Score: 15; Trauma Score: 12                                                                 

                                                                                                  

MDM:                                                                                              

10:34 Patient medically screened.                                                             ec2 

10:42 Data reviewed: vital signs. ED course: Patient arrives today for evaluation of sacral   ec2 

      pain. Examination remarkable for muscular findings as above. Will obtain radiographs        

      and treat the patient's symptoms. Suspect strain, additionally considering sciatica.        

      Doubt fracture. .                                                                           

12:18 ED course: Pelvis x-ray independently reviewed and interpreted by me, showed no bony    ec2 

      fracture. Sacral x-ray with no bony pathology. Will discharge home and have the patient     

      follow-up outpatient expectantly. Return precautions given..                                

                                                                                                  

08                                                                                             

10:42 Order name: Pelvis XRAY; Complete Time: 12:17                                           ec2 

                                                                                             

10:42 Order name: Sacrum And Coccyx XRAY; Complete Time: 12:17                                ec2 

                                                                                                  

Administered Medications:                                                                         

11:55 Drug: Ketorolac IM 30 mg IM once Route: IM; Site: left vastus lateralis;                ll1 

13:20 Follow up: Response: No adverse reaction                                                ll1 

11:55 Drug: Lidoderm Topical Patch 5 % (700 mg/patch) 1 patches Topical once; leave on for 12 ll1 

      hours; cover most painful area; may cut into smaller pieces {Note: low back.} Route:        

      Topical; Site: affected area;                                                               

13:20 Follow up: Response: No adverse reaction                                                ll1 

11:55 Drug: Diazepam PO 10 mg PO once {Note: pain 8/10.} Route: PO;                           ll1 

13:20 Follow up: Response: No adverse reaction; Pain is decreased; RASS: Alert and Calm (0)   ll1 

11:55 Drug: Acetaminophen PO 1000 mg PO once Route: PO;                                       ll1 

13:20 Follow up: Response: No adverse reaction                                                ll1 

                                                                                                  

                                                                                                  

Disposition Summary:                                                                              

24 12:24                                                                                    

Discharge Ordered                                                                                 

 Notes:       Location: Home                                                                        
  ec2

      Condition: Stable                                                                       ec2 

      Diagnosis                                                                                   

        - Low back pain                                                                       ec2 

      Followup:                                                                               ec2 

        - With: Private Physician                                                                  

        - When:                                                                                    

        - Reason: Re-evaluation by your physician                                                  

      Discharge Instructions:                                                                     

        - Discharge Summary Sheet                                                             ec2 

        - Acute Back Pain, Adult                                                              ec2 

      Forms:                                                                                      

        - Medication Reconciliation Form                                                      ec2 

        - Antibiotic Education                                                                ec2 

        - Prescription Opioid Use                                                             ec2 

        - Patient Portal Instructions                                                         ec2 

        - Leadership Thank You Letter                                                         ec2 

      Prescriptions:                                                                              

        - methocarbamol 500 mg Oral tablet                                                         

            - take 1 tablet ORAL route 4 times per day; 30 tablet; Refills: 0, Product        ec2 

      Selection Permitted                                                                         

Signatures:                                                                                       

Dispatcher [employer]                           Carey Padgett RN RN                                                      

Colby Brooks RN RN   ll1                                                  

Rome Lopez MD MD   ec2                                                  

                                                                                                  

**************************************************************************************************

## 2024-10-17 ENCOUNTER — HOSPITAL ENCOUNTER (INPATIENT)
Dept: HOSPITAL 97 - 2ND | Age: 79
LOS: 2 days | Discharge: HOME HEALTH | DRG: 603 | End: 2024-10-19
Attending: SURGERY | Admitting: SURGERY
Payer: COMMERCIAL

## 2024-10-17 VITALS — BODY MASS INDEX: 34.9 KG/M2

## 2024-10-17 DIAGNOSIS — Z85.46: ICD-10-CM

## 2024-10-17 DIAGNOSIS — Z96.641: ICD-10-CM

## 2024-10-17 DIAGNOSIS — L03.115: Primary | ICD-10-CM

## 2024-10-17 LAB
ALBUMIN SERPL BCP-MCNC: 3.2 G/DL (ref 3.4–5)
ALBUMIN/GLOB SERPL: 0.9 {RATIO} (ref 1.1–1.8)
ALP SERPL-CCNC: 176 U/L (ref 45–117)
ALT SERPL W P-5'-P-CCNC: < 14 U/L (ref 16–61)
ANION GAP SERPL CALC-SCNC: 8 MEQ/L (ref 5–15)
AST SERPL W P-5'-P-CCNC: 17 U/L (ref 15–37)
BILIRUB INDIRECT SERPL-MCNC: 0.2 MG/DL (ref 0.2–0.8)
BUN BLD-MCNC: 7 MG/DL (ref 7–18)
GLOBULIN SER CALC-MCNC: 3.4 G/DL (ref 2.3–3.5)
GLUCOSE SERPLBLD-MCNC: 148 MG/DL (ref 74–106)
HCT VFR BLD CALC: 36.5 % (ref 39.6–49)
HGB BLD-MCNC: 12.4 G/DL (ref 13.6–17.9)
LIPASE SERPL-CCNC: 51 U/L (ref 13–75)
LYMPHOCYTES # SPEC AUTO: 0.7 K/UL (ref 0.7–4.9)
MAGNESIUM SERPL-MCNC: 2.2 MG/DL (ref 1.6–2.4)
MCH RBC QN AUTO: 28.7 PG (ref 27–35)
MCHC RBC AUTO-ENTMCNC: 34.1 G/DL (ref 32–36)
MCV RBC: 84.4 FL (ref 80–100)
NRBC # BLD: 0 10*3/UL (ref 0–0)
NRBC BLD AUTO-RTO: 0 % (ref 0–0)
PMV BLD: 9.1 FL (ref 7.6–11.3)
POTASSIUM SERPL-SCNC: 4 MEQ/L (ref 3.5–5.1)
RBC # BLD: 4.33 M/UL (ref 4.33–5.43)
WBC # BLD AUTO: 5.7 THOU/UL (ref 4.3–10.9)

## 2024-10-17 PROCEDURE — 71045 X-RAY EXAM CHEST 1 VIEW: CPT

## 2024-10-17 PROCEDURE — 93005 ELECTROCARDIOGRAM TRACING: CPT

## 2024-10-17 PROCEDURE — 36415 COLL VENOUS BLD VENIPUNCTURE: CPT

## 2024-10-17 PROCEDURE — 83735 ASSAY OF MAGNESIUM: CPT

## 2024-10-17 PROCEDURE — 02HV33Z INSERTION OF INFUSION DEVICE INTO SUPERIOR VENA CAVA, PERCUTANEOUS APPROACH: ICD-10-PCS

## 2024-10-17 PROCEDURE — 87040 BLOOD CULTURE FOR BACTERIA: CPT

## 2024-10-17 PROCEDURE — 80076 HEPATIC FUNCTION PANEL: CPT

## 2024-10-17 PROCEDURE — 36569 INSJ PICC 5 YR+ W/O IMAGING: CPT

## 2024-10-17 PROCEDURE — 80048 BASIC METABOLIC PNL TOTAL CA: CPT

## 2024-10-17 PROCEDURE — 83690 ASSAY OF LIPASE: CPT

## 2024-10-17 PROCEDURE — 97161 PT EVAL LOW COMPLEX 20 MIN: CPT

## 2024-10-17 PROCEDURE — 85025 COMPLETE CBC W/AUTO DIFF WBC: CPT

## 2024-10-17 PROCEDURE — 80202 ASSAY OF VANCOMYCIN: CPT

## 2024-10-17 RX ADMIN — HYDROCODONE BITARTRATE AND ACETAMINOPHEN PRN TAB: 5; 325 TABLET ORAL at 22:14

## 2024-10-17 RX ADMIN — SODIUM CHLORIDE SCH MLS: 0.9 INJECTION, SOLUTION INTRAVENOUS at 21:00

## 2024-10-17 RX ADMIN — SODIUM CHLORIDE ONE: 0.9 INJECTION, SOLUTION INTRAVENOUS at 21:27

## 2024-10-17 RX ADMIN — SODIUM CHLORIDE ONE MLS: 0.9 INJECTION, SOLUTION INTRAVENOUS at 18:00

## 2024-10-17 RX ADMIN — MUPIROCIN SCH APPL: 20 OINTMENT TOPICAL at 21:20

## 2024-10-17 RX ADMIN — SODIUM CHLORIDE ONE: 9 INJECTION, SOLUTION INTRAVENOUS at 21:26

## 2024-10-17 NOTE — HP
Date of Admission:  10/17/2024



History Of Present Illness:  Patient is an 79-year-old gentleman with 3-week 
history of cellulitis of his right leg after a fall and fracturing his right 
hip.  The fracture was managed nonoperatively.  However, patient has had some 
bruising on his leg and subsequently developed cellulitis with redness, edema, 
warmth, and increasing pain and discomfort.  The patient was seeing Dr. Shoemaker 
as an outpatient.  Was treated with Rocephin IM for about a week.  However, his 
symptoms did not improve.  Therefore, he has failed outpatient treatment.  He 
has swelling, throbbing, redness, and warmth still.  Denies any fevers or chills
or any groin discomfort.  No sore throat, runny nose, cough, headaches, or 
dizziness.  No chest pain.  No fevers or chills, at this time.



Review of Systems:

Otherwise, unremarkable.



Past Medical History:  Prostate cancer, treated with radiation.



Past Surgical History:  Right leg and ankle surgery, both hip replacement, and 
back surgery.



Allergies:  NO ALLERGIES.



Social History:  Patient denies smoking or drinking alcohol.



Family History:  Noncontributory except for father having an unknown type of 
cancer.



Physical Examination:

Vital Signs:  Stable.  He is afebrile. 

General:  He is awake, alert, oriented x3. 

Head and Neck:  No masses. 

Chest:  Clear. 

Heart:  S1, S2. 

Abdomen:  Soft. 

Extremities:  Bruising on the right knee.  Redness, swelling and warmth on the 
right leg.  Some epidermis slough in the bottom of the leg with some oozing of 
serous fluid.  No open wound per se.  Diminished dorsalis pedis and posterior 
tibial pulses.  The left leg has some mild erythema, but no edema, erythema, 
warmth, or tenderness.  There is tenderness in the right leg, however. 

Neuro:  Nonfocal.



Assessment:  An 79-year-old gentleman with cellulitis of right lower extremity 
following a fall and the patient failed outpatient antibiotic therapy.



Recommendation:  Admit, IV vancomycin.  ID consult to see if patient needs 
additional antibiotics.  We will monitor the response and make further 
recommendations after that.  The patient will need at least 2 weeks of IV 
antibiotics.  We will get a PICC line.  We will check his labs and we would 
manage his pain with pain medication.  Plan of care discussed in detail with the
patient and family.  They understand and agreed to the plan.





AS/YAMILETH

DD:  10/17/2024 15:57:19   Voice ID:  [id number]

LINDY

## 2024-10-17 NOTE — XMS REPORT
Continuity of Care Document



                          Created on: 2024





DENNIS LANDRY

External Reference #: [id number]

: 1945

Sex: Male



Demographics





                                        Address             [address]

[place]  [address]

 

                                        Home Phone          [phone]

 

                                        Work Phone          [phone]

 

                                        Mobile Phone        [phone]

 

                                        Email Address       [email or web address]

 

                                        Preferred Language  English

 

                                        Marital Status      Unknown

 

                                        Episcopalian Affiliation Unknown

 

                                        Race                Unknown

 

                                        Additional Race(s)  Unavailable

White

 

                                        Ethnic Group        Unknown





Author





                                        Name                Unknown

 

                                        Address             [address]

[place] [id number]

 

                                        [facility]

 

                                        Address             [address]

[address]

[place]  [address]

 

                                        Phone               [phone]





Support





                          Name         Relationship Address      Phone

 

                          LANDRYIRINA            Unknown      Unavailable

 

                                Dennis Landry Personal Relationship [address] [place]  [address]                  [phone]





Care Team Providers





                                Care Team Member Name Role            Phone

 

                                PCP, PATIENT DOES NOT HAVE A Primary Care Physic

barrett Unavailable

 

                                William Shoemaker Attending Clinician Unavailable

 

                                RAFAEL LOPEZ Attending Clinician UnavailRAFAEL Hebert Attending Clinician Unavaildee Hernandez_S_AH     Attending Clinician Unavailable

 

                                Gretchen-Mbayo_A_AH Attending Clinician Unavailable

 

                                Miller_S_AH     Admitting Clinician Unavailable

 

                                Gretchen-Mbayo_A_AH Admitting Clinician Unavailable







Payers





                    Payer Name Policy Type Policy Number Effective Date Expirati

on Date Source

 

                          [employer] HMO                 [id number]     2021 

00:00:00                                            

 

                                                    [place] 

[employer]                         [id number]           2017 

00:00:00                                2024-10-16 

00:00:00                                

 

                                                    [employer] Preferred 

Medicare (HMO) 53           [id number]                               [facility]

 

                                                    [facility] 

- [place] 

(MEDICARE 

REPLACEMENT/ADV

ANTAGE - HMO)                           [id number]           2020 

00:00:00                                            







Problems





                                                    Condition 

Name                                    Condition 

Details                                 Condition 

Category                  Status                    Onset 

Date                                    Resolution 

Date                                    Last 

Treatment 

Date                                    Treating 

Clinician                 Comments                  Source

 

                                                    Rheumatoid 

arthritis                               Rheumatoid 

Arthritis           Problem             Active               

00:00:

00                                                               [facility]

[facility]

 

                                        [id number]           Gross 

hematuria Problem                                                 [facility]

 

                                        [id number]           Prostate 

cancer  Problem                                                 [facility]

 

                                        [id number]           History of 

prostate 

cancer  Problem                                                 [facility]

 

                                        [id number]           Acquired 

phimosis 

of penis Problem                                                 [facility]

 

                                        [id number]           S/P 

radiation 

therapy Problem                                                 [facility]

 

                                        [id number]           Lower 

urinary 

tract 

symptoms 

(LUTS)  Problem                                                 [facility]

 

                                                    [id number]

[id number]                                 Other 

stricture 

of 

overlappin

g sites of 

urethra in 

male    Problem                                                 [facility]

 

                                                    Personal 

history of 

primary 

malignant 

neoplasm 

of urinary 

bladder                                 History of 

primary 

bladder 

cancer  Problem                                                 [facility]

 

                                        [id number]            Type 2 

diabetes 

mellitus 

with 

diabetic 

neuropathy

, without 

long-term 

current 

use of 

insulin Problem                                                 [facility]

 

                                        [id number]            Chronic 

obstructiv

e 

pulmonary 

disease, 

unspecifie

d COPD 

type    Problem                                                 [facility]







Allergies, Adverse Reactions, Alerts





                                                    Allergy 

Name                                    Allergy 

Type            Status          Severity        Reaction(s)     Onset 

Date                                    Inactive 

Date                                    Treating 

Clinician                 Comments                  Source

 

                                                    NO KNOWN 

ALLERGIE

S                                       Drug 

Class   Active                                                  [facility]

[facility]







Social History





                    Social Habit Start Date Stop Date Quantity  Comments  Source

 

                    History of Tobacco Use                                      

   [facility]

 

                    Sex Assigned At Birth                                       

  [facility]







                          Smoking Status Start Date   Stop Date    Source

 

                          Former Smoker 2022 00:00:00 2022 00:00:00 

[facility]







Medications





                                                    Ordered 

Medication 

Name                                    Filled 

Medication 

Name                                    Start 

Date                                    Stop 

Date                                    Current 

Medication?                             Ordering 

Clinician       Indication      Dosage          Frequency       Signature 

(SIG)               Comments            Components          Source

 

                                                    Clotrimazol

e-Betametha

sone 1-0.05 

%                                       Clotrimazol

e-Betametha

sone 1-0.05 

%                                       2022 

00:00:

00                                       

00:00

:00        No                                          BID        Clotrimazo

le-Betamet

hasone 

1-0.05 %                                                    

 

          Flomax Flomax           No                       Flomax           

 

                                                    Cipro 500 

MG                                      Cipro 500 

MG                               No                               1{table

t}                        BID                       Cipro 500 

MG                                                          

 

                                                    No Known 

Medications                             No Known 

Medications             No                                              [facility]







Vital Signs





                      Vital Name Observation Time Observation Value Comments   S

ource

 

                      height     2022 14:30:00 68 [in_i]             Commo

n [facility]

 

                      weight     2022 14:30:00 215 [lb_av]            Comm

on [facility]

 

                      temperature 2022 14:30:00 97.5 [degF]            Com

mon [facility]

 

                      bmi        2022 14:30:00 32.69 kg/m2            Comm

on [facility]

 

                      oximetry   2022 14:30:00 98 %                  Commo

n [facility]

 

                      respiratory rate 2022 14:30:00 18 /min              

 Common [facility]

 

                                                    blood pressure 

systolic        2022 14:30:00 146 mm[Hg]                      [facility]

t [facility]

 

                                                    blood pressure 

diastolic       2022 14:30:00 68 mm[Hg]                       [facility]

[facility]

 

                      height     2022 11:30:00 68 [in_i]             Commo

n [facility]

 

                      weight     2022 11:30:00 211 [lb_av]            Comm

on [facility]

 

                      temperature 2022 11:30:00 97.3 [degF]            Com

[facility]

 

                      bmi        2022 11:30:00 32.08 kg/m2            Comm

on [facility]

 

                      oximetry   2022 11:30:00 97 %                  Commo

n [facility]

 

                      respiratory rate 2022 11:30:00 16 /min              

 [facility]

 

                                                    blood pressure 

systolic        2022 11:30:00 163 mm[Hg]                      [facility]

t [facility]

 

                                                    blood pressure 

diastolic       2022 11:30:00 73 mm[Hg]                       [facility]

t [facility]

 

                      BP Systolic 2021 00:00:00 115 mm[Hg]            [facility]

 

                      Body Weight 2021 00:00:00 210 [lb_av]            [facility]

 

                      BP Diastolic 2021 00:00:00 80 mm[Hg]             [facility]

 

                      Height     2021 00:00:00 68 [in_i]             [facility]

 

                      BMI (Body Mass Index) 2021 00:00:00 31.9 kg/m2      

      [facility]







Plan of Care





                      Planned Activity Planned Date Details    Comments   Source

 

                                Future Scheduled Test                 Patient de

nies any 

pain or discomfirt. 

Patient denies taking 

any meds at this time. 

Patient report he is 

able to see his doc 

when needed. Patient 

encouarged to choose 

healther food choices 

and increase activites 

as tolerated. [code = 

Patient denies any 

pain or discomfirt. 

Patient denies taking 

any meds at this time. 

Patient report h]                                   [facility]

 

                      Instructions                                  [facility]







Encounters





                                                    Start 

Date/Time                               End 

Date/Time                               Encounter 

Type                                    Admission 

Type                                    Attending 

Clinicians                              Care 

Facility                                Care 

Department                              Encounter 

ID                                      Source

 

                                                    2022 

11:23:07                        Outpatient                      William Shoemaker              [facility]              [id number]                                   [facility]

 

                                                    2024-10-23 

14:00:00                                2024-10-23 

14:00:00            Outpatient          RAFAEL FRANCIS CRAIG           [facility]            [id number]      [facility]

 

                                                    2024-10-15 

09:00:00                                2024-10-15 

09:00:00            Outpatient          RAFAEL FRANCIS CRAIG           [facility]            [id number]      [facility]

 

                                                    2022 

00:00:00                                2022 

00:00:00  (TEL)                         [facility]    [id number]   [facility]

 

                                                    2022 

00:00:00                                2022 

00:00:00                                OFFICE 

VISIT 

ESTAB PT 

LEVEL [facility]                          [facility]     [id number]    [facility]

 

                                                    2022 

00:00:00                                2022 

00:00:00                                OFFICE 

VISIT NEW 

PT LEVEL 4                       [facility]     [id number]    [facility]

 

                                                    2021 

06:24:00                                2021 

06:24:00   Outpatient            Miller_S_AH [facility]        [id number]

[id number]                                   [facility]

e

 

                                                    2021 

02:43:00                                2021 

02:43:00            Outpatient                              Gretchen-Fátimao

_A_AH               [facility]                 [id number]

[id number]                                   [facility]

e

 

                                                    2021 

09:05:00                                2021 

09:05:00            Outpatient                              Gretchen-Mbayo

_A_AH               [facility]                 [facility]                 [phone]

[id number]                                   [facility]

e

 

                                                    2021 

11:13:00                                2021 

11:13:00            Outpatient                              Gretchen-Mbayo

_A_AH               [facility]                 [facility]                 [phone]

[id number]                                   [facility]

e

 

                                                    2021 

09:44:00                                2021 

09:44:00            Outpatient                              Gretchen-Mbayo

_A_AH               [facility]                 [facility]                 [phone]

[id number]                                   [facility]

e

 

                                                    2021 

00:00:00                                2021 

00:00:00                                Elma rinaldi, NP: 

[id number] 

[facility], [address], 

[place] 

[address]

, Ph. 

[phone]                                        [facility]             [place] 

[facility] 

Medical - 

VM_HOU_V@_

[place] 

Direct                    [id number]                  [facility]

e

 

                                                    2020-07-15 

12:32:00                                2020-07-15 

12:32:00            Outpatient                              Gretchen-Mbayo

_A_AH               [facility]                 [facility]                 [phone]

[id number]                                   [facility]

e

 

                                                    2020-07-15 

12:32:00                                2020-07-15 

12:32:00            Outpatient                              Gretchen-Mbayo

_A_AH               [facility]                 [facility]                 [phone]

[id number]                                   [facility]

e

 

                                                    2020 

07:18:00                                2020 

07:18:00            Outpatient                              Gretchen-Mbayo

_A_AH               [facility]                 [facility]                 [phone]

[id number]                                   [facility]

e

 

                                                    2020 

07:18:00                                2020 

07:18:00            Outpatient                              Gretchen-Mbayo

_A_AH               [facility]                 [facility]                 [phone]

[id number]                                   [facility]

e

## 2024-10-17 NOTE — RAD REPORT
EXAMINATION: ONE VIEW CHEST XR



CLINICAL INDICATION: PICC placement



TECHNIQUE: Frontal chest projection is submitted. Examination is limited by patient positioning and t
echnique.



COMPARISON: 11/20/2016



FINDINGS:



The lungs are well inflated and clear. The heart is normal in size. No displaced fractures identified
.  Right-sided PICC line with tip in SVC.



IMPRESSION: 



Right-sided PICC line has its tip in SVC. 







Reported By: Alex Zimmerman 

Electronically Signed:  10/17/2024 7:47 PM

## 2024-10-18 VITALS — OXYGEN SATURATION: 96 %

## 2024-10-18 RX ADMIN — ALTEPLASE SCH MG: 2.2 INJECTION, POWDER, LYOPHILIZED, FOR SOLUTION INTRAVENOUS at 17:18

## 2024-10-18 RX ADMIN — HYDROMORPHONE HYDROCHLORIDE PRN MG: 1 INJECTION, SOLUTION INTRAMUSCULAR; INTRAVENOUS; SUBCUTANEOUS at 07:18

## 2024-10-18 RX ADMIN — SODIUM CHLORIDE SCH: 3 INJECTION, SOLUTION INTRAVENOUS at 17:01

## 2024-10-18 NOTE — PN
Date of Progress Note:  10/18/2024



Subjective:  The patient is awake and alert, feels better.  Pain is better.



Objective:  Vital Signs:  Stable, afebrile. 

Lines/Drains:  The patient his PICC line in. 

Skin:  The dressing is clean, dry, and intact.  There is no increase in erythema.



Laboratory Data:  Reviewed.



Assessment:  Cellulitis, right leg.



Recommendations:  Await discharge planning and ID consult.  Discharge planning is in progress.  Yin howard, discharge in a day or two.





AS/MODL

DD:  10/18/2024 10:21:13Voice ID:  [id number]

DT:  10/18/2024 10:47:45Report ID:  [id number]

## 2024-10-19 VITALS — TEMPERATURE: 97.2 F | DIASTOLIC BLOOD PRESSURE: 60 MMHG | SYSTOLIC BLOOD PRESSURE: 139 MMHG

## 2024-10-19 NOTE — DS
Date of Discharge:  10/19/2024



Admitting Diagnosis:  Cellulitis, right lower extremity, failed outpatient therapy.



Discharge Diagnosis:  Cellulitis, right lower extremity, failed outpatient therapy.



Hospital Course:  The patient is an 79-year-old gentleman, who was admitted for IV antibiotics and he
 would require at least 2 weeks of IV antibiotics.  PICC line was started and vancomycin was started.
  Discharge planning was begun.  The patient had Home Health for IV antibiotics arranged for and the 
patient is clinically improving on vancomycin.



Laboratory Data:  Laboratory data reviewed.  Microbiology showed no growth in the blood cultures.  We
 will discharge the patient.



Disposition:  Home.



Condition:  Stable.



Discharge Instructions:  Vancomycin per protocol.  Follow up with Dr. Shoemaker's office.  Resume home 
medications and diet.  Activity as tolerated and mild compression on both lower extremities as direct
ed.  Wound care as ordered.





AS/YAMILETH

DD:  10/19/2024 10:03:26Voice ID:  [id number]

DT:  10/19/2024 10:16:41Report ID:  [id number]

## 2024-10-20 ENCOUNTER — HOSPITAL ENCOUNTER (EMERGENCY)
Dept: HOSPITAL 97 - ER | Age: 79
Discharge: HOME | End: 2024-10-20
Payer: COMMERCIAL

## 2024-10-20 VITALS — DIASTOLIC BLOOD PRESSURE: 66 MMHG | SYSTOLIC BLOOD PRESSURE: 155 MMHG | TEMPERATURE: 97.8 F | OXYGEN SATURATION: 100 %

## 2024-10-20 DIAGNOSIS — T82.594A: Primary | ICD-10-CM

## 2024-10-20 PROCEDURE — 99282 EMERGENCY DEPT VISIT SF MDM: CPT

## 2024-10-20 NOTE — EDPHYS
Physician Documentation                                                                           

 [facility]                                                                 

Name: Dennis Ramsay                                                                            

Age: 79 yrs                                                                                       

Sex: Male                                                                                         

: 1945                                                                                   

MRN: [id number]                                                                                   

Arrival Date: 10/20/2024                                                                          

Time: 10:37                                                                                       

Account#: [id number]                                                                            

Bed IW1                                                                                           

Private MD:                                                                                       

ED Physician Hadley Dyer                                                                      

HPI:                                                                                              

10/20                                                                                             

11:11 This 79 yrs old Male presents to ER via Ambulatory with complaints of picc line problem.kobe 

11:11 The patient or guardian complains of picc not working. The complaints affect the right  kobe 

      bicep. Context: The problem was sustained at work, resulted from unknown cause. Onset:      

      The symptoms/episode began/occurred yesterday, last night. Treatment prior to arrival       

      includes: no previous treatment. Modifying factors: The symptoms are alleviated by          

      nothing. the symptoms are aggravated by nothing. Associated signs and symptoms: The         

      patient has no apparent associated signs or symptoms. Severity of symptoms: At their        

      worst the symptoms were mild, in the emergency department the symptoms are unchanged.       

      The patient has experienced similar episodes in the past, a few times.                      

                                                                                                  

Historical:                                                                                       

- Allergies:                                                                                      

10:52 Latex;                                                                                  ll1 

- PMHx:                                                                                           

10:52 Cancer; Cholelithiasis; COPD; Diabetes - IDDM; diabetes mellitus; Hypertensive disorder;ll1 

- PSHx:                                                                                           

10:52 hip replacement x2;                                                                     ll1 

                                                                                                  

- Immunization history:: Adult Immunizations up to date.                                          

- Infectious Disease History:: Denies.                                                            

- Family history:: not pertinent.                                                                 

- Social history:: Smoking status: Patient denies any tobacco usage or history of.                

                                                                                                  

                                                                                                  

ROS:                                                                                              

11:11 Constitutional: Negative for fever, chills, and weight loss, Eyes: Negative for injury, kobe 

      pain, redness, and discharge, ENT: Negative for injury, pain, and discharge, Neck:          

      Negative for injury, pain, and swelling, Cardiovascular: Negative for chest pain,           

      palpitations, and edema, Respiratory: Negative for shortness of breath, cough,              

      wheezing, and pleuritic chest pain, Abdomen/GI: Negative for abdominal pain, nausea,        

      vomiting, diarrhea, and constipation, Back: Negative for injury and pain, : Negative      

      for injury, bleeding, discharge, and swelling, Skin: Negative for injury, rash, and         

      discoloration, Neuro: Negative for headache, weakness, numbness, tingling, and seizure,     

      Psych: Negative for depression, anxiety, suicide ideation, homicidal ideation, and          

      hallucinations, Allergy/Immunology: Negative for hives, rash, and allergies, Endocrine:     

      Negative for neck swelling, polydipsia, polyuria, polyphagia, and marked weight             

      changes, Hematologic/Lymphatic: Negative for swollen nodes, abnormal bleeding, and          

      unusual bruising,                                                                           

11:11 MS/extremity: Positive for PICC NOT FLUSHING,                                               

                                                                                                  

Exam:                                                                                             

11:11 Constitutional:  This is a well developed, well nourished patient who is awake, alert,  kboe 

      and in no acute distress. Head/Face:  Normocephalic, atraumatic. Eyes:  Pupils equal        

      round and reactive to light, extra-ocular motions intact.  Lids and lashes normal.          

      Conjunctiva and sclera are non-icteric and not injected.  Cornea within normal limits.      

      Periorbital areas with no swelling, redness, or edema. ENT:  Nares patent. No nasal         

      discharge, no septal abnormalities noted.  Tympanic membranes are normal and external       

      auditory canals are clear.  Oropharynx with no redness, swelling, or masses, exudates,      

      or evidence of obstruction, uvula midline.  Mucous membranes moist. Neck:  Trachea          

      midline, no thyromegaly or masses palpated, and no cervical lymphadenopathy.  Supple,       

      full range of motion without nuchal rigidity, or vertebral point tenderness.  No            

      Meningismus. Chest/axilla:  Normal chest wall appearance and motion.  Nontender with no     

      deformity.  No lesions are appreciated. Cardiovascular:  Regular rate and rhythm with a     

      normal S1 and S2.  No gallops, murmurs, or rubs.  Normal PMI, no JVD.  No pulse             

      deficits. Respiratory:  Lungs have equal breath sounds bilaterally, clear to                

      auscultation and percussion.  No rales, rhonchi or wheezes noted.  No increased work of     

      breathing, no retractions or nasal flaring. Abdomen/GI:  Soft, non-tender, with normal      

      bowel sounds.  No distension or tympany.  No guarding or rebound.  No evidence of           

      tenderness throughout. Back:  No spinal tenderness.  No costovertebral tenderness.          

      Full range of motion. Male :  Normal genitalia with no discharge or lesions. Skin:        

      Warm, dry with normal turgor.  Normal color with no rashes, no lesions, and no evidence     

      of cellulitis.                                                                              

                                                                                                  

Vital Signs:                                                                                      

11:01  / 66; Pulse 77; Resp 16; Temp 97.8; Pulse Ox 100% ; Pain 0/10;                   ll1 

11:01 Pain Scale: Adult                                                                       ll1 

                                                                                                  

MDM:                                                                                              

10:40 Medical Screening Exam initiated                                                        [facility] 

11:15 Data reviewed: vital signs, nurses notes. Consideration of Admission/Observation        kobe 

      Escalation of care including admission/observation considered. I considered the             

      following discharge prescriptions or medication management in the emergency department      

      Medications were administered in the Emergency Department. See MAR. Historians other        

      than the Patient: PT WELL INFORMED. Care significantly affected by the following            

      chronic conditions: Diabetes, Hypertension, Chronic Obstructive Pulmonary Disease,          

      Cancer.                                                                                     

                                                                                                  

Administered Medications:                                                                         

No medications were administered                                                                  

                                                                                                  

                                                                                                  

Disposition Summary:                                                                              

10/20/24 11:22                                                                                    

Discharge Ordered                                                                                 

 Notes:       Location: Home                                                                        
  kobe

      Problem: new                                                                            kobe 

      Symptoms: have improved                                                                 kobe 

      Condition: Stable                                                                       kobe 

      Diagnosis                                                                                   

        - Other mechanical complication of unspecified cardiac and vascular devices and       kobe 

      implants, initial encounter - PICC , FLUSHED                                                

      Followup:                                                                               kobe 

        - With: Private Physician                                                                  

        - When: 2 - 3 days                                                                         

        - Reason: Recheck today's complaints, Continuance of care, Re-evaluation by your           

      physician                                                                                   

      Discharge Instructions:                                                                     

        - Discharge Summary Sheet                                                             kobe 

        - PICC Home Care Guide                                                                kobe 

        - PICC Insertion, Care After                                                          kobe 

        - PICC Insertion                                                                      kobe 

      Forms:                                                                                      

        - Medication Reconciliation Form                                                      kobe 

        - Antibiotic Education                                                                kobe 

        - Prescription Opioid Use                                                             kobe 

        - Patient Portal Instructions                                                         [facility] 

        - Leadership Thank You Letter                                                         [facility] 

Signatures:                                                                                       

Hadley Dyer MD MD cha Lewis, Lynsay, RN                       RN   ll1                                                  

                                                                                                  

**************************************************************************************************

## 2024-10-20 NOTE — XMS REPORT
Continuity of Care Document



                          Created on: 2024





DENNIS LANDRY

External Reference #: [id number]

: 1945

Sex: Male



Demographics





                                        Address             [address]

[place]  [address]

 

                                        Home Phone          [phone]

 

                                        Work Phone          [phone]

 

                                        Mobile Phone        [phone]

 

                                        Email Address       [email or web address]

 

                                        Preferred Language  English

 

                                        Marital Status      Unknown

 

                                        Islam Affiliation Unknown

 

                                        Race                Unknown

 

                                        Additional Race(s)  Unavailable

White

 

                                        Ethnic Group        Unknown





Author





                                        Name                Unknown

 

                                        Address             [address]

[place]  [address]

 

                                        [facility]

 

                                        Address             [address]

[address]

[place]  [address]

 

                                        Phone               [phone]





Support





                          Name         Relationship Address      Phone

 

                          IRINA LANDRY            Unknown      Unavailable

 

                                Dennis Landry Personal Relationship [address] [place], [place]  [id number] [phone]





Care Team Providers





                                Care Team Member Name Role            Phone

 

                                PCP, PATIENT DOES NOT HAVE A Primary Care Physic

barrett Unavailable

 

                                William Shoemaker Attending Clinician Unavailable

 

                                RAFAEL LOPEZ Attending Clinician UnavailRAFAEL Hebert Attending Clinician Unavaildee Hernandez_S_AH     Attending Clinician Unavailable

 

                                Gretchen-Mbayo_A_AH Attending Clinician Unavailable

 

                                Miller_S_AH     Admitting Clinician Unavailable

 

                                Gretchen-Mbayo_A_AH Admitting Clinician Unavailable







Payers





                    Payer Name Policy Type Policy Number Effective Date Expirati

on Date Source

 

                          [employer] HMO                 [id number]     2021 

00:00:00                                            

 

                                                    [place] 

PL[place]/[employer]                         [id number]           2017 

00:00:00                                2024-10-16 

00:00:00                                

 

                                                    [employer] Preferred 

Medicare (HMO) 53           [id number]                               [facility]

 

                                                    [facility] 

- [employer] 

(MEDICARE 

REPLACEMENT/ADV

ANTAGE - HMO)                           [id number]           2020 

00:00:00                                            







Problems





                                                    Condition 

Name                                    Condition 

Details                                 Condition 

Category                  Status                    Onset 

Date                                    Resolution 

Date                                    Last 

Treatment 

Date                                    Treating 

Clinician                 Comments                  Source

 

                                                    Rheumatoid 

arthritis                               Rheumatoid 

Arthritis           Problem             Active               

00:00:

00                                                               [facility]

[facility]

 

                                        [id number]           Gross 

hematuria Problem                                                 [facility]

 

                                        [id number]           Prostate 

cancer  Problem                                                 [facility]

 

                                        [id number]           History of 

prostate 

cancer  Problem                                                 [facility]

 

                                        [id number]           Acquired 

phimosis 

of penis Problem                                                 [facility]

 

                                        [id number]           S/P 

radiation 

therapy Problem                                                 [facility]

 

                                        [id number]           Lower 

urinary 

tract 

symptoms 

(LUTS)  Problem                                                 [facility]

 

                                                    [id number]

[id number]                                 Other 

stricture 

of 

overlappin

g sites of 

urethra in 

male    Problem                                                 [facility]

 

                                                    Personal 

history of 

primary 

malignant 

neoplasm 

of urinary 

bladder                                 History of 

primary 

bladder 

cancer  Problem                                                 [facility]

 

                                        [id number]            Type 2 

diabetes 

mellitus 

with 

diabetic 

neuropathy

, without 

long-term 

current 

use of 

insulin Problem                                                 [facility]

 

                                        [id number]            Chronic 

obstructiv

e 

pulmonary 

disease, 

unspecifie

d COPD 

type    Problem                                                 [facility]







Allergies, Adverse Reactions, Alerts





                                                    Allergy 

Name                                    Allergy 

Type            Status          Severity        Reaction(s)     Onset 

Date                                    Inactive 

Date                                    Treating 

Clinician                 Comments                  Source

 

                                                    NO KNOWN 

ALLERGIE

S                                       Drug 

Class   Active                                                  [facility]

[facility]







Social History





                    Social Habit Start Date Stop Date Quantity  Comments  Source

 

                    History of Tobacco Use                                      

   [facility]

 

                    Sex Assigned At Birth                                       

  [facility]







                          Smoking Status Start Date   Stop Date    Source

 

                          Former Smoker 2022 00:00:00 2022 00:00:00 

[facility]







Medications





                                                    Ordered 

Medication 

Name                                    Filled 

Medication 

Name                                    Start 

Date                                    Stop 

Date                                    Current 

Medication?                             Ordering 

Clinician       Indication      Dosage          Frequency       Signature 

(SIG)               Comments            Components          Source

 

                                                    Clotrimazol

e-Betametha

sone 1-0.05 

%                                       Clotrimazol

e-Betametha

sone 1-0.05 

%                                       2022 

00:00:

00                                       

00:00

:00        No                                          BID        Clotrimazo

le-Betamet

hasone 

1-0.05 %                                                    

 

          Flomax Flomax           No                       Flomax           

 

                                                    Cipro 500 

MG                                      Cipro 500 

MG                               No                               1{table

t}                        BID                       Cipro 500 

MG                                                          

 

                                                    No Known 

Medications                             No Known 

Medications             No                                              [facility]







Vital Signs





                      Vital Name Observation Time Observation Value Comments   S

ource

 

                      height     2022 14:30:00 68 [in_i]             Commo

n [facility]

 

                      weight     2022 14:30:00 215 [lb_av]            Comm

on [facility]

 

                      temperature 2022 14:30:00 97.5 [degF]            Com

mon [facility]

 

                      bmi        2022 14:30:00 32.69 kg/m2            Comm

on [facility]

 

                      oximetry   2022 14:30:00 98 %                  Commo

n [facility]

 

                      respiratory rate 2022 14:30:00 18 /min              

 Common [facility]

 

                                                    blood pressure 

systolic        2022 14:30:00 146 mm[Hg]                      [facility]

t [facility]

 

                                                    blood pressure 

diastolic       2022 14:30:00 68 mm[Hg]                       Common [facility]

 

                      height     2022 11:30:00 68 [in_i]             Commo

n [facility]

 

                      weight     2022 11:30:00 211 [lb_av]            Comm

on [facility]

 

                      temperature 2022 11:30:00 97.3 [degF]            Com

[facility]

 

                      bmi        2022 11:30:00 32.08 kg/m2            Comm

on [facility]

 

                      oximetry   2022 11:30:00 97 %                  Commo

n [facility]

 

                      respiratory rate 2022 11:30:00 16 /min              

 [facility]

 

                                                    blood pressure 

systolic        2022 11:30:00 163 mm[Hg]                      [facility]

t [facility]

 

                                                    blood pressure 

diastolic       2022 11:30:00 73 mm[Hg]                       [facility]

[facility]

 

                      BP Systolic 2021 00:00:00 115 mm[Hg]            [facility]

 

                      Body Weight 2021 00:00:00 210 [lb_av]            [facility]

 

                      BP Diastolic 2021 00:00:00 80 mm[Hg]             [facility]

 

                      Height     2021 00:00:00 68 [in_i]             [facility]

 

                      BMI (Body Mass Index) 2021 00:00:00 31.9 kg/m2      

      [facility]







Plan of Care





                      Planned Activity Planned Date Details    Comments   Source

 

                                Future Scheduled Test                 Patient de

nies any 

pain or discomfirt. 

Patient denies taking 

any meds at this time. 

Patient report he is 

able to see his doc 

when needed. Patient 

encouarged to choose 

healther food choices 

and increase activites 

as tolerated. [code = 

Patient denies any 

pain or discomfirt. 

Patient denies taking 

any meds at this time. 

Patient report h]                                   [facility]

 

                      Instructions                                  [facility] 

Practice







Encounters





                                                    Start 

Date/Time                               End 

Date/Time                               Encounter 

Type                                    Admission 

Type                                    Attending 

Clinicians                              Care 

Facility                                Care 

Department                              Encounter 

ID                                      Source

 

                                                    2022 

11:23:07                        Outpatient                      William Shoemaker              [facility]              [id number]

[id number]                                   [facility]

 

                                                    2024-10-23 

14:00:00                                2024-10-23 

14:00:00            Outpatient          RAFAEL FRANCIS CRAIG           [facility]            [id number]      [facility]

 

                                                    2024-10-15 

09:00:00                                2024-10-15 

09:00:00            Outpatient          RAFAEL FRANCIS CRAIG           [facility]            [id number]      [facility]

 

                                                    2022 

00:00:00                                2022 

00:00:00  [facility]    [id number]   [facility]

 

                                                    2022 

00:00:00                                2022 

00:00:00                                OFFICE 

VISIT 

ESTAB PT 

LEVEL [facility]     [id number]    [facility]

 

                                                    2022 

00:00:00                                2022 

00:00:00                                OFFICE 

VISIT NEW 

[facility] LEVEL 4                       [facility]     [id number]    [facility]

 

                                                    2021 

06:24:00                                2021 

06:24:00   Outpatient            Miller_S_AH [facility]        [id number]

[id number]                                   [facility]

e

 

                                                    2021 

02:43:00                                2021 

02:43:00            Outpatient                              Gretchen-Fátimao

_A_AH               [facility]                 [facility]                 [phone]

[id number]                                   [facility]

[facility]

 

                                                    2021 

09:05:00                                2021 

09:05:00            Outpatient                              Gretchen-Mbayo

_A_AH               [facility]                 [facility]                 [phone]

[id number]                                   [facility]

e

 

                                                    2021 

11:13:00                                2021 

11:13:00            Outpatient                              Gretchen-Mbayo

_A_AH               [facility]                 [facility]                 [phone]

[id number]                                   [facility]

e

 

                                                    2021 

09:44:00                                2021 

09:44:00            Outpatient                              Gretchen-Mbayo

_A_AH               [facility]                 [facility]                 [phone]

[id number]                                   [facility]

e

 

                                                    2021 

00:00:00                                2021 

00:00:00                                Elma rinaldi, NP: 

[id number] 

[facility], [address], 

[place] 

[address]

, Ph. 

[phone]                                        [facility]FP             [place] 

[facility] 

VM_HOU_V@_

[place] 

[facility]                    [id number]                  [facility]

e

 

                                                    2020-07-15 

12:32:00                                2020-07-15 

12:32:00            Outpatient                              Gretchen-Mbayo

_A_AH               [facility]                 [facility]                 [phone]

[id number]                                   [facility]

[facility]

 

                                                    2020-07-15 

12:32:00                                2020-07-15 

12:32:00            Outpatient                              Gretchen-Mbayo

_A_AH               [facility]                 [facility]                 [phone]

[id number]                                   [facility]

e

 

                                                    2020 

07:18:00                                2020 

07:18:00            Outpatient                              Gretchen-Mbayo

_A_AH               [facility]                 [facility]                 [phone]

[id number]                                   [facility]

e

 

                                                    2020 

07:18:00                                2020 

07:18:00            Outpatient                              Gretchen-Mbayo

_A_AH               [facility]                 [facility]                 [phone]

[id number]                                   [facility]

e

## 2024-10-20 NOTE — ER
Nurse's Notes                                                                                     

 [facility] [place]                                                                 

Name: Dennis Ramsay                                                                            

Age: 79 yrs                                                                                       

Sex: Male                                                                                         

: 1945                                                                                   

MRN: [id number]                                                                                   

Arrival Date: 10/20/2024                                                                          

Time: 10:37                                                                                       

Account#: [id number]                                                                            

Bed IW1                                                                                           

Private MD:                                                                                       

Diagnosis: Other mechanical complication of unspecified cardiac and vascular devices and implants,

  initial encounter-PICC , FLUSHED                                                                

                                                                                                  

Presentation:                                                                                     

10/20                                                                                             

11:00 Coronavirus screen: Vaccine status: At this time, the client does not indicate any      ll1 

      symptoms associated with coronavirus-19. Onset of symptoms was 2024.            

11:01 Chief complaint: Patient states: R PICC red line stopped infusing last night. Ebola     ll1 

      Screen: Patient denies travel to an Ebola-affected area in the 21 days before illness       

      onset. Initial Sepsis Screen: Does the patient meet any 2 criteria? No. Patient's           

      initial sepsis screen is negative. Does the patient have a suspected source of              

      infection? No. Patient's initial sepsis screen is negative. Risk Assessment: Do you         

      want to hurt yourself or someone else? Patient reports no desire to harm self or others.    

11:01 Method Of Arrival: Ambulatory                                                           ll1 

11:01 Acuity: SHANTHI 5                                                                           ll1 

                                                                                                  

Triage Assessment:                                                                                

10:52 General: Appears in no apparent distress. Behavior is calm, cooperative, appropriate    ll1 

      for age. General: Reports R arm PICC. Pain: Denies pain. Neuro: No deficits noted.          

      Musculoskeletal: Circulation, motion, and sensation intact. Capillary refill < 3            

      seconds, in right fingers.                                                                  

                                                                                                  

Historical:                                                                                       

- Allergies:                                                                                      

10:52 Latex;                                                                                  ll1 

- PMHx:                                                                                           

10:52 Cancer; Cholelithiasis; COPD; Diabetes - IDDM; diabetes mellitus; Hypertensive disorder;ll1 

- PSHx:                                                                                           

10:52 hip replacement x2;                                                                     ll1 

                                                                                                  

- Immunization history:: Adult Immunizations up to date.                                          

- Infectious Disease History:: Denies.                                                            

- Family history:: not pertinent.                                                                 

- Social history:: Smoking status: Patient denies any tobacco usage or history of.                

                                                                                                  

                                                                                                  

Screenin:00 [facility] ED Fall Risk Assessment (Adult) History of falling in the last 3 months,       ll1 

      including since admission No falls in past 3 months (0 pts) Confusion or Disorientation     

      No (0 pts) Intoxicated or Sedated No (0 pts) Impaired Gait No (0 pts) Mobility Assist       

      Device Used Yes (1 pt) Altered Elimination No (0 pt) Score/Fall Risk Level 0 - 2 = Low      

      Risk Maintained a safe environment, Hourly rounding (assess needs \T\ fall precautionary    

      measures) done. Abuse screen: Denies threats or abuse. Nutritional screening: No            

      deficits noted. Tuberculosis screening: No symptoms or risk factors identified.             

                                                                                                  

Assessment:                                                                                       

10:50 Reassessment: red port would not flush. Purple port flushes easily. States he will use  ll1 

      purple port, and follow-up with his doctor.                                                 

                                                                                                  

Vital Signs:                                                                                      

11:01  / 66; Pulse 77; Resp 16; Temp 97.8; Pulse Ox 100% ; Pain 0/10;                   ll1 

11:01 Pain Scale: Adult                                                                       ll1 

                                                                                                  

ED Course:                                                                                        

10:38 Patient arrived in ED.                                                                  im  

10:40 Hadley Dyer MD is Attending Physician.                                             [facility] 

10:53 Arm band placed on.                                                                     ll1 

11:00 No provider procedures requiring assistance completed. Patient did not have IV access   ll1 

      during this emergency room visit.                                                           

11:02 Triage completed.                                                                       ll1 

11:22 Patient has correct armband on for positive identification. Provided Education on:      ll1 

      return as needed. Cardiac monitoring not applicable on this patient.                        

                                                                                                  

Administered Medications:                                                                         

No medications were administered                                                                  

                                                                                                  

                                                                                                  

Medication:                                                                                       

11:22 VIS not applicable for this client.                                                     ll1 

                                                                                                  

Outcome:                                                                                          

11:22 Discharge ordered by MD.                                                                [facility] 

11:22 Discharged to home ambulatory,                                                          ll1 

11:22 Condition: stable                                                                           

11:22 Discharge instructions given to patient, Instructed on discharge instructions, follow       

      up and referral plans. Demonstrated understanding of instructions, follow-up care, left     

      with verbal discharge instructions only                                                     

11:25 Patient left the ED.                                                                    ll1 

                                                                                                  

Signatures:                                                                                       

Hadley Dyer MD MD cha Lewis, Lynsay RN                       RN   ll1                                                  

Aubrie Iyer                                                                                  

                                                                                                  

**************************************************************************************************

## 2025-05-05 LAB
ANION GAP SERPL CALC-SCNC: 9.9 MEQ/L (ref 5–15)
HCT VFR BLD CALC: 39.7 % (ref 39.6–49)
HGB BLD-MCNC: 13.6 G/DL (ref 13.6–17.9)
LYMPHOCYTES # SPEC AUTO: 0.9 K/UL (ref 0.7–4.9)
MCH RBC QN AUTO: 27.9 PG (ref 27–35)
MCHC RBC AUTO-ENTMCNC: 34.1 G/DL (ref 32–36)
MCV RBC: 81.7 FL (ref 80–100)
NRBC BLD AUTO-RTO: 0.1 % (ref 0–0)
PMV BLD: 9.7 FL (ref 7.6–11.3)
POTASSIUM SERPL-SCNC: 3.9 MEQ/L (ref 3.5–5.1)
RBC # BLD: 4.86 M/UL (ref 4.33–5.43)

## 2025-05-09 ENCOUNTER — HOSPITAL ENCOUNTER (OUTPATIENT)
Dept: HOSPITAL 97 - OR | Age: 80
Discharge: HOME | End: 2025-05-09
Attending: OTOLARYNGOLOGY
Payer: COMMERCIAL

## 2025-05-09 VITALS — OXYGEN SATURATION: 98 % | SYSTOLIC BLOOD PRESSURE: 165 MMHG | DIASTOLIC BLOOD PRESSURE: 79 MMHG

## 2025-05-09 VITALS — TEMPERATURE: 97 F

## 2025-05-09 DIAGNOSIS — C44.42: ICD-10-CM

## 2025-05-09 DIAGNOSIS — L57.0: ICD-10-CM

## 2025-05-09 DIAGNOSIS — C44.219: Primary | ICD-10-CM

## 2025-05-09 PROCEDURE — 88331 PATH CONSLTJ SURG 1 BLK 1SPC: CPT

## 2025-05-09 PROCEDURE — 85025 COMPLETE CBC W/AUTO DIFF WBC: CPT

## 2025-05-09 PROCEDURE — 09B1XZZ EXCISION OF LEFT EXTERNAL EAR, EXTERNAL APPROACH: ICD-10-PCS

## 2025-05-09 PROCEDURE — 88305 TISSUE EXAM BY PATHOLOGIST: CPT

## 2025-05-09 PROCEDURE — 36415 COLL VENOUS BLD VENIPUNCTURE: CPT

## 2025-05-09 PROCEDURE — 15275 SKIN SUB GRAFT FACE/NK/HF/G: CPT

## 2025-05-09 PROCEDURE — 0HR0XK3 REPLACEMENT OF SCALP SKIN WITH NONAUTOLOGOUS TISSUE SUBSTITUTE, FULL THICKNESS, EXTERNAL APPROACH: ICD-10-PCS

## 2025-05-09 PROCEDURE — 80048 BASIC METABOLIC PNL TOTAL CA: CPT

## 2025-05-09 PROCEDURE — 69110 REMOVE EXTERNAL EAR PARTIAL: CPT

## 2025-05-09 PROCEDURE — 93005 ELECTROCARDIOGRAM TRACING: CPT

## 2025-05-09 PROCEDURE — 88332 PATH CONSLTJ SURG EA ADD BLK: CPT

## 2025-05-09 PROCEDURE — 11624 EXC S/N/H/F/G MAL+MRG 3.1-4: CPT

## 2025-05-09 PROCEDURE — 11106 INCAL BX SKN SINGLE LES: CPT

## 2025-05-09 PROCEDURE — 0HB0XZX EXCISION OF SCALP SKIN, EXTERNAL APPROACH, DIAGNOSTIC: ICD-10-PCS

## 2025-05-09 RX ADMIN — SODIUM CHLORIDE, SODIUM LACTATE, POTASSIUM CHLORIDE, AND CALCIUM CHLORIDE ONE MLS: .6; .31; .03; .02 INJECTION, SOLUTION INTRAVENOUS at 09:13

## 2025-05-09 RX ADMIN — LIDOCAINE HYDROCHLORIDE AND EPINEPHRINE ONE ML: 10; 10 INJECTION, SOLUTION INFILTRATION; PERINEURAL at 09:25
